# Patient Record
Sex: FEMALE | Race: WHITE | Employment: OTHER | URBAN - METROPOLITAN AREA
[De-identification: names, ages, dates, MRNs, and addresses within clinical notes are randomized per-mention and may not be internally consistent; named-entity substitution may affect disease eponyms.]

---

## 2020-02-05 ENCOUNTER — APPOINTMENT (OUTPATIENT)
Dept: ULTRASOUND IMAGING | Age: 70
DRG: 694 | End: 2020-02-05
Payer: MEDICAID

## 2020-02-05 ENCOUNTER — APPOINTMENT (OUTPATIENT)
Dept: GENERAL RADIOLOGY | Age: 70
DRG: 694 | End: 2020-02-05
Payer: MEDICAID

## 2020-02-05 ENCOUNTER — HOSPITAL ENCOUNTER (INPATIENT)
Age: 70
LOS: 7 days | Discharge: HOME | DRG: 694 | End: 2020-02-12
Attending: EMERGENCY MEDICINE | Admitting: INTERNAL MEDICINE
Payer: MEDICAID

## 2020-02-05 ENCOUNTER — APPOINTMENT (OUTPATIENT)
Dept: CT IMAGING | Age: 70
DRG: 694 | End: 2020-02-05
Payer: MEDICAID

## 2020-02-05 PROBLEM — R19.00 PELVIC MASS: Status: ACTIVE | Noted: 2020-02-05

## 2020-02-05 PROBLEM — R33.8 ACUTE URINARY RETENTION: Status: ACTIVE | Noted: 2020-02-05

## 2020-02-05 PROBLEM — N13.30 HYDRONEPHROSIS: Status: ACTIVE | Noted: 2020-02-05

## 2020-02-05 LAB
ALBUMIN SERPL-MCNC: 3 G/DL (ref 3.5–5.2)
ALP BLD-CCNC: 110 U/L (ref 35–104)
ALT SERPL-CCNC: 6 U/L (ref 0–32)
ANION GAP SERPL CALCULATED.3IONS-SCNC: 10 MMOL/L (ref 7–16)
AST SERPL-CCNC: 12 U/L (ref 0–31)
B.E.: -3.8 MMOL/L (ref -3–3)
BACTERIA: ABNORMAL /HPF
BASOPHILS ABSOLUTE: 0.02 E9/L (ref 0–0.2)
BASOPHILS RELATIVE PERCENT: 0.2 % (ref 0–2)
BETA-HYDROXYBUTYRATE: 0.76 MMOL/L (ref 0.02–0.27)
BILIRUB SERPL-MCNC: 0.2 MG/DL (ref 0–1.2)
BILIRUBIN URINE: NEGATIVE
BLOOD, URINE: ABNORMAL
BUN BLDV-MCNC: 12 MG/DL (ref 8–23)
CALCIUM SERPL-MCNC: 9.1 MG/DL (ref 8.6–10.2)
CHLORIDE BLD-SCNC: 91 MMOL/L (ref 98–107)
CHP ED QC CHECK: NORMAL
CLARITY: ABNORMAL
CO2: 21 MMOL/L (ref 22–29)
COHB: 1.7 % (ref 0–1.5)
COLOR: YELLOW
CREAT SERPL-MCNC: 0.8 MG/DL (ref 0.5–1)
CRITICAL: ABNORMAL
DATE ANALYZED: ABNORMAL
DATE OF COLLECTION: ABNORMAL
EOSINOPHILS ABSOLUTE: 0.1 E9/L (ref 0.05–0.5)
EOSINOPHILS RELATIVE PERCENT: 0.9 % (ref 0–6)
EPITHELIAL CELLS, UA: ABNORMAL /HPF
GFR AFRICAN AMERICAN: >60
GFR NON-AFRICAN AMERICAN: >60 ML/MIN/1.73
GLUCOSE BLD-MCNC: 461 MG/DL
GLUCOSE BLD-MCNC: 472 MG/DL (ref 74–99)
GLUCOSE URINE: >=1000 MG/DL
HCO3: 20.6 MMOL/L (ref 22–26)
HCT VFR BLD CALC: 27.5 % (ref 34–48)
HEMOGLOBIN: 8.2 G/DL (ref 11.5–15.5)
HHB: 6.2 % (ref 0–5)
IMMATURE GRANULOCYTES #: 0.14 E9/L
IMMATURE GRANULOCYTES %: 1.3 % (ref 0–5)
KETONES, URINE: NEGATIVE MG/DL
LAB: ABNORMAL
LACTIC ACID: 1.1 MMOL/L (ref 0.5–2.2)
LEUKOCYTE ESTERASE, URINE: ABNORMAL
LYMPHOCYTES ABSOLUTE: 0.9 E9/L (ref 1.5–4)
LYMPHOCYTES RELATIVE PERCENT: 8.3 % (ref 20–42)
Lab: ABNORMAL
MCH RBC QN AUTO: 24.5 PG (ref 26–35)
MCHC RBC AUTO-ENTMCNC: 29.8 % (ref 32–34.5)
MCV RBC AUTO: 82.1 FL (ref 80–99.9)
METER GLUCOSE: 325 MG/DL (ref 74–99)
METER GLUCOSE: 461 MG/DL (ref 74–99)
METHB: 0.3 % (ref 0–1.5)
MODE: ABNORMAL
MONOCYTES ABSOLUTE: 0.97 E9/L (ref 0.1–0.95)
MONOCYTES RELATIVE PERCENT: 8.9 % (ref 2–12)
NEUTROPHILS ABSOLUTE: 8.76 E9/L (ref 1.8–7.3)
NEUTROPHILS RELATIVE PERCENT: 80.4 % (ref 43–80)
NITRITE, URINE: POSITIVE
O2 CONTENT: 11.3 ML/DL
O2 SATURATION: 93.7 % (ref 92–98.5)
O2HB: 91.8 % (ref 94–97)
OPERATOR ID: 7874
PATIENT TEMP: 37 C
PCO2: 34.8 MMHG (ref 35–45)
PDW BLD-RTO: 16.3 FL (ref 11.5–15)
PH BLOOD GAS: 7.39 (ref 7.35–7.45)
PH UA: 6 (ref 5–9)
PLATELET # BLD: 308 E9/L (ref 130–450)
PMV BLD AUTO: 10.7 FL (ref 7–12)
PO2: 70.2 MMHG (ref 75–100)
POTASSIUM SERPL-SCNC: 4.1 MMOL/L (ref 3.5–5)
PROTEIN UA: NEGATIVE MG/DL
RBC # BLD: 3.35 E12/L (ref 3.5–5.5)
RBC UA: ABNORMAL /HPF (ref 0–2)
SODIUM BLD-SCNC: 122 MMOL/L (ref 132–146)
SOURCE, BLOOD GAS: ABNORMAL
SPECIFIC GRAVITY UA: <=1.005 (ref 1–1.03)
THB: 8.7 G/DL (ref 11.5–16.5)
TIME ANALYZED: 1550
TOTAL PROTEIN: 6.9 G/DL (ref 6.4–8.3)
TROPONIN: <0.01 NG/ML (ref 0–0.03)
TSH SERPL DL<=0.05 MIU/L-ACNC: 0.08 UIU/ML (ref 0.27–4.2)
UROBILINOGEN, URINE: 0.2 E.U./DL
WBC # BLD: 10.9 E9/L (ref 4.5–11.5)
WBC UA: >20 /HPF (ref 0–5)

## 2020-02-05 PROCEDURE — 87077 CULTURE AEROBIC IDENTIFY: CPT

## 2020-02-05 PROCEDURE — 99223 1ST HOSP IP/OBS HIGH 75: CPT | Performed by: INTERNAL MEDICINE

## 2020-02-05 PROCEDURE — 2580000003 HC RX 258: Performed by: INTERNAL MEDICINE

## 2020-02-05 PROCEDURE — 74177 CT ABD & PELVIS W/CONTRAST: CPT

## 2020-02-05 PROCEDURE — 2060000000 HC ICU INTERMEDIATE R&B

## 2020-02-05 PROCEDURE — 82962 GLUCOSE BLOOD TEST: CPT

## 2020-02-05 PROCEDURE — 6370000000 HC RX 637 (ALT 250 FOR IP): Performed by: EMERGENCY MEDICINE

## 2020-02-05 PROCEDURE — 72170 X-RAY EXAM OF PELVIS: CPT

## 2020-02-05 PROCEDURE — 6360000002 HC RX W HCPCS: Performed by: NURSE PRACTITIONER

## 2020-02-05 PROCEDURE — APPSS30 APP SPLIT SHARED TIME 16-30 MINUTES: Performed by: NURSE PRACTITIONER

## 2020-02-05 PROCEDURE — 6370000000 HC RX 637 (ALT 250 FOR IP): Performed by: NURSE PRACTITIONER

## 2020-02-05 PROCEDURE — 71045 X-RAY EXAM CHEST 1 VIEW: CPT

## 2020-02-05 PROCEDURE — 2580000003 HC RX 258: Performed by: PHYSICIAN ASSISTANT

## 2020-02-05 PROCEDURE — 93005 ELECTROCARDIOGRAM TRACING: CPT | Performed by: PHYSICIAN ASSISTANT

## 2020-02-05 PROCEDURE — 83605 ASSAY OF LACTIC ACID: CPT

## 2020-02-05 PROCEDURE — 6360000002 HC RX W HCPCS: Performed by: EMERGENCY MEDICINE

## 2020-02-05 PROCEDURE — 6360000004 HC RX CONTRAST MEDICATION: Performed by: RADIOLOGY

## 2020-02-05 PROCEDURE — 80053 COMPREHEN METABOLIC PANEL: CPT

## 2020-02-05 PROCEDURE — 87040 BLOOD CULTURE FOR BACTERIA: CPT

## 2020-02-05 PROCEDURE — 82010 KETONE BODYS QUAN: CPT

## 2020-02-05 PROCEDURE — 2580000003 HC RX 258: Performed by: RADIOLOGY

## 2020-02-05 PROCEDURE — 2580000003 HC RX 258: Performed by: NURSE PRACTITIONER

## 2020-02-05 PROCEDURE — 93970 EXTREMITY STUDY: CPT

## 2020-02-05 PROCEDURE — 82805 BLOOD GASES W/O2 SATURATION: CPT

## 2020-02-05 PROCEDURE — 94760 N-INVAS EAR/PLS OXIMETRY 1: CPT

## 2020-02-05 PROCEDURE — 84484 ASSAY OF TROPONIN QUANT: CPT

## 2020-02-05 PROCEDURE — 81001 URINALYSIS AUTO W/SCOPE: CPT

## 2020-02-05 PROCEDURE — 85025 COMPLETE CBC W/AUTO DIFF WBC: CPT

## 2020-02-05 PROCEDURE — 87088 URINE BACTERIA CULTURE: CPT

## 2020-02-05 PROCEDURE — 84443 ASSAY THYROID STIM HORMONE: CPT

## 2020-02-05 PROCEDURE — 87186 SC STD MICRODIL/AGAR DIL: CPT

## 2020-02-05 PROCEDURE — 96374 THER/PROPH/DIAG INJ IV PUSH: CPT

## 2020-02-05 PROCEDURE — 99285 EMERGENCY DEPT VISIT HI MDM: CPT

## 2020-02-05 PROCEDURE — 2580000003 HC RX 258: Performed by: EMERGENCY MEDICINE

## 2020-02-05 RX ORDER — LISINOPRIL 2.5 MG/1
2.5 TABLET ORAL DAILY
COMMUNITY

## 2020-02-05 RX ORDER — METFORMIN HYDROCHLORIDE 500 MG/1
500 TABLET, EXTENDED RELEASE ORAL 2 TIMES DAILY
Status: DISCONTINUED | OUTPATIENT
Start: 2020-02-05 | End: 2020-02-05

## 2020-02-05 RX ORDER — METFORMIN HYDROCHLORIDE 500 MG/1
500 TABLET, EXTENDED RELEASE ORAL 2 TIMES DAILY
COMMUNITY

## 2020-02-05 RX ORDER — ONDANSETRON 2 MG/ML
4 INJECTION INTRAMUSCULAR; INTRAVENOUS EVERY 8 HOURS PRN
Status: DISCONTINUED | OUTPATIENT
Start: 2020-02-05 | End: 2020-02-05

## 2020-02-05 RX ORDER — DEXTROSE MONOHYDRATE 25 G/50ML
12.5 INJECTION, SOLUTION INTRAVENOUS PRN
Status: DISCONTINUED | OUTPATIENT
Start: 2020-02-05 | End: 2020-02-12 | Stop reason: HOSPADM

## 2020-02-05 RX ORDER — SODIUM CHLORIDE 9 MG/ML
INJECTION, SOLUTION INTRAVENOUS CONTINUOUS
Status: DISCONTINUED | OUTPATIENT
Start: 2020-02-05 | End: 2020-02-05

## 2020-02-05 RX ORDER — MORPHINE SULFATE 2 MG/ML
2 INJECTION, SOLUTION INTRAMUSCULAR; INTRAVENOUS ONCE
Status: COMPLETED | OUTPATIENT
Start: 2020-02-05 | End: 2020-02-05

## 2020-02-05 RX ORDER — LISINOPRIL 2.5 MG/1
2.5 TABLET ORAL DAILY
Status: DISCONTINUED | OUTPATIENT
Start: 2020-02-05 | End: 2020-02-12 | Stop reason: HOSPADM

## 2020-02-05 RX ORDER — INSULIN GLARGINE 100 [IU]/ML
10 INJECTION, SOLUTION SUBCUTANEOUS NIGHTLY
Status: DISCONTINUED | OUTPATIENT
Start: 2020-02-05 | End: 2020-02-12 | Stop reason: HOSPADM

## 2020-02-05 RX ORDER — ACYCLOVIR 400 MG/1
400 TABLET ORAL 2 TIMES DAILY
COMMUNITY

## 2020-02-05 RX ORDER — SODIUM CHLORIDE 0.9 % (FLUSH) 0.9 %
10 SYRINGE (ML) INJECTION EVERY 12 HOURS SCHEDULED
Status: DISCONTINUED | OUTPATIENT
Start: 2020-02-05 | End: 2020-02-05

## 2020-02-05 RX ORDER — DEXTROSE MONOHYDRATE 50 MG/ML
100 INJECTION, SOLUTION INTRAVENOUS PRN
Status: DISCONTINUED | OUTPATIENT
Start: 2020-02-05 | End: 2020-02-12 | Stop reason: HOSPADM

## 2020-02-05 RX ORDER — ONDANSETRON 2 MG/ML
4 INJECTION INTRAMUSCULAR; INTRAVENOUS EVERY 6 HOURS PRN
Status: DISCONTINUED | OUTPATIENT
Start: 2020-02-05 | End: 2020-02-12

## 2020-02-05 RX ORDER — NICOTINE POLACRILEX 4 MG
15 LOZENGE BUCCAL PRN
Status: DISCONTINUED | OUTPATIENT
Start: 2020-02-05 | End: 2020-02-12 | Stop reason: HOSPADM

## 2020-02-05 RX ORDER — ALOGLIPTIN 25 MG/1
25 TABLET, FILM COATED ORAL DAILY
COMMUNITY

## 2020-02-05 RX ORDER — NATEGLINIDE 60 MG/1
150 TABLET ORAL 3 TIMES DAILY
Status: DISCONTINUED | OUTPATIENT
Start: 2020-02-05 | End: 2020-02-05

## 2020-02-05 RX ORDER — SODIUM CHLORIDE 0.9 % (FLUSH) 0.9 %
10 SYRINGE (ML) INJECTION EVERY 12 HOURS SCHEDULED
Status: DISCONTINUED | OUTPATIENT
Start: 2020-02-05 | End: 2020-02-12 | Stop reason: HOSPADM

## 2020-02-05 RX ORDER — ACYCLOVIR 200 MG/1
400 CAPSULE ORAL 2 TIMES DAILY
Status: DISCONTINUED | OUTPATIENT
Start: 2020-02-05 | End: 2020-02-12 | Stop reason: HOSPADM

## 2020-02-05 RX ORDER — 0.9 % SODIUM CHLORIDE 0.9 %
1000 INTRAVENOUS SOLUTION INTRAVENOUS ONCE
Status: COMPLETED | OUTPATIENT
Start: 2020-02-05 | End: 2020-02-05

## 2020-02-05 RX ORDER — LITHIUM CARBONATE 300 MG/1
300 CAPSULE ORAL 2 TIMES DAILY WITH MEALS
Status: DISCONTINUED | OUTPATIENT
Start: 2020-02-05 | End: 2020-02-12 | Stop reason: HOSPADM

## 2020-02-05 RX ORDER — FENOFIBRATE 160 MG/1
160 TABLET ORAL DAILY
COMMUNITY

## 2020-02-05 RX ORDER — ACETAMINOPHEN 325 MG/1
650 TABLET ORAL EVERY 4 HOURS PRN
Status: DISCONTINUED | OUTPATIENT
Start: 2020-02-05 | End: 2020-02-05 | Stop reason: SDUPTHER

## 2020-02-05 RX ORDER — FENOFIBRATE 160 MG/1
160 TABLET ORAL DAILY
Status: DISCONTINUED | OUTPATIENT
Start: 2020-02-05 | End: 2020-02-12 | Stop reason: HOSPADM

## 2020-02-05 RX ORDER — LEVOTHYROXINE SODIUM 0.05 MG/1
50 TABLET ORAL DAILY
COMMUNITY

## 2020-02-05 RX ORDER — ACETAMINOPHEN 325 MG/1
650 TABLET ORAL EVERY 4 HOURS PRN
Status: DISCONTINUED | OUTPATIENT
Start: 2020-02-05 | End: 2020-02-05

## 2020-02-05 RX ORDER — SODIUM CHLORIDE 0.9 % (FLUSH) 0.9 %
10 SYRINGE (ML) INJECTION PRN
Status: COMPLETED | OUTPATIENT
Start: 2020-02-05 | End: 2020-02-05

## 2020-02-05 RX ORDER — SODIUM CHLORIDE 0.9 % (FLUSH) 0.9 %
10 SYRINGE (ML) INJECTION PRN
Status: DISCONTINUED | OUTPATIENT
Start: 2020-02-05 | End: 2020-02-05

## 2020-02-05 RX ORDER — SODIUM CHLORIDE 0.9 % (FLUSH) 0.9 %
10 SYRINGE (ML) INJECTION PRN
Status: DISCONTINUED | OUTPATIENT
Start: 2020-02-05 | End: 2020-02-12 | Stop reason: HOSPADM

## 2020-02-05 RX ORDER — LEVOTHYROXINE SODIUM 0.05 MG/1
50 TABLET ORAL DAILY
Status: DISCONTINUED | OUTPATIENT
Start: 2020-02-06 | End: 2020-02-12 | Stop reason: HOSPADM

## 2020-02-05 RX ORDER — MORPHINE SULFATE 4 MG/ML
4 INJECTION, SOLUTION INTRAMUSCULAR; INTRAVENOUS EVERY 4 HOURS PRN
Status: DISCONTINUED | OUTPATIENT
Start: 2020-02-05 | End: 2020-02-12

## 2020-02-05 RX ORDER — SODIUM CHLORIDE 9 MG/ML
INJECTION, SOLUTION INTRAVENOUS CONTINUOUS
Status: DISCONTINUED | OUTPATIENT
Start: 2020-02-05 | End: 2020-02-08

## 2020-02-05 RX ORDER — LITHIUM CARBONATE 300 MG/1
300 CAPSULE ORAL 2 TIMES DAILY WITH MEALS
COMMUNITY

## 2020-02-05 RX ADMIN — LISINOPRIL 2.5 MG: 2.5 TABLET ORAL at 22:19

## 2020-02-05 RX ADMIN — Medication 10 ML: at 16:21

## 2020-02-05 RX ADMIN — FENOFIBRATE 160 MG: 160 TABLET ORAL at 22:21

## 2020-02-05 RX ADMIN — INSULIN GLARGINE 10 UNITS: 100 INJECTION, SOLUTION SUBCUTANEOUS at 22:25

## 2020-02-05 RX ADMIN — ENOXAPARIN SODIUM 60 MG: 60 INJECTION SUBCUTANEOUS at 23:17

## 2020-02-05 RX ADMIN — Medication 10 ML: at 22:18

## 2020-02-05 RX ADMIN — INSULIN HUMAN 5 UNITS: 100 INJECTION, SOLUTION PARENTERAL at 18:20

## 2020-02-05 RX ADMIN — SODIUM CHLORIDE 1000 ML: 9 INJECTION, SOLUTION INTRAVENOUS at 17:20

## 2020-02-05 RX ADMIN — MORPHINE SULFATE 2 MG: 2 INJECTION, SOLUTION INTRAMUSCULAR; INTRAVENOUS at 18:55

## 2020-02-05 RX ADMIN — INSULIN LISPRO 4 UNITS: 100 INJECTION, SOLUTION INTRAVENOUS; SUBCUTANEOUS at 22:37

## 2020-02-05 RX ADMIN — MORPHINE SULFATE 2 MG: 2 INJECTION, SOLUTION INTRAMUSCULAR; INTRAVENOUS at 17:20

## 2020-02-05 RX ADMIN — ACYCLOVIR 400 MG: 200 CAPSULE ORAL at 22:24

## 2020-02-05 RX ADMIN — SODIUM CHLORIDE: 9 INJECTION, SOLUTION INTRAVENOUS at 22:18

## 2020-02-05 RX ADMIN — SODIUM CHLORIDE 1000 ML: 9 INJECTION, SOLUTION INTRAVENOUS at 15:49

## 2020-02-05 RX ADMIN — IOPAMIDOL 110 ML: 755 INJECTION, SOLUTION INTRAVENOUS at 16:23

## 2020-02-05 SDOH — HEALTH STABILITY: MENTAL HEALTH: HOW OFTEN DO YOU HAVE A DRINK CONTAINING ALCOHOL?: NEVER

## 2020-02-05 ASSESSMENT — PAIN SCALES - GENERAL
PAINLEVEL_OUTOF10: 9
PAINLEVEL_OUTOF10: 10

## 2020-02-05 NOTE — H&P
Provider, MD   alogliptin (NESINA) 25 MG TABS tablet Take 25 mg by mouth daily   Yes Historical Provider, MD   lithium 300 MG capsule Take 300 mg by mouth 2 times daily (with meals)   Yes Historical Provider, MD   fenofibrate 160 MG tablet Take 160 mg by mouth daily   Yes Historical Provider, MD   lisinopril (PRINIVIL;ZESTRIL) 2.5 MG tablet Take 2.5 mg by mouth daily   Yes Historical Provider, MD   Nateglinide (STARLIX PO) Take 150 mg by mouth 3 times daily   Yes Historical Provider, MD       Allergies:    Codeine and Sulfa antibiotics    Social History:    reports that she has quit smoking. She has never used smokeless tobacco. She reports that she does not drink alcohol or use drugs. Family History:   family history is not on file.        PHYSICAL EXAM:  Vitals:  BP (!) 178/85   Pulse 95   Temp 97.5 °F (36.4 °C) (Oral)   Resp 16   Ht 5' 2\" (1.575 m)   SpO2 95%     General Appearance: alert and oriented to person, place and time and in no acute distress  Skin: warm and dry, no rashes or lesions noted, does have several superficial sacral decubiti  Head: normocephalic and atraumatic  Eyes: pupils equal, round, and reactive to light, extraocular eye movements intact, conjunctivae normal  Neck: neck supple and non tender without mass   Pulmonary/Chest: clear to auscultation bilaterally- no wheezes, rales or rhonchi, normal air movement, no respiratory distress on room air  Cardiovascular: normal rate and rythym , normal S1 and S2 and no murmur noted, has port right upper chest  Abdomen: firm, tender in lower abdomen, distended, normal bowel sounds, R nephrostomy tube intact draining clear yellow urine  Extremities: no cyanosis, no clubbing and +RLE edema  Neurologic: follows commands, pleasant with exam and speech normal        LABS:  Recent Labs     02/05/20  1433 02/05/20  1504   *  --    K 4.1  --    CL 91*  --    CO2 21*  --    BUN 12  --    CREATININE 0.8  --    GLUCOSE 472* 461   CALCIUM 9.1  -- Extraocular muscles intact. Pupils equal, round, reactive to light. I agree with the assessment and plan of Adelita Bush, FANI - CNP    Pelvic mass  Hydronephrosis with hydroureter  Hyponatremia likely from hyperglycemia and hypovolemia  Dehydration   Dm type 2 uncontrolled  Acidosis   Bipolar d/o  RLE dvt        Electronically signed by Lenny Friend D.O.   Hospitalist  [facility] at [facility]

## 2020-02-05 NOTE — ED PROVIDER NOTES
ED Attending  CC: No        HPI:  2/5/20,   Time: 2:07 PM         Bart Link is a 79 y.o. female presenting to the ED for sacral wound, lower abdominal pain, weakness and tachycardia, beginning few days ago. The complaint has been persistent, moderate in severity, and worsened by nothing. Patient presents to the emergency room via private car with her son and daughter-in-law. She has a complex history uterine cancer with mets to her left lung. The patient has been treated and cared for in the [facility] by her other son. The family states that she has had several rounds of chemotherapy and radiation. They come in today concerned about her increasing weakness. Patient's last hospitalization was in the [facility] area around [place]. She was diagnosed with a right lower extremity DVT at that time and placed on Eliquis. The family states that over the past month the patient has been increasingly weak. She has sacral ulcers that are painful and draining. The patient has had some vaginal discharge of a dark and bloody nature as well. Her daughter-in-law feels that the patient's lower abdomen is bloated. The patient's biggest complaint is the pain in her sacral area. She does have a left-sided nephrostomy tube. The daughter in law feels that her urine has been more dark and cloudy. The patient is diabetic and does not check her sugars at home. She states that she is not on insulin but that her sugars are generally controlled well with metformin. The patient denies any shortness of breath. She has a mild chronic cough. No documented fevers. They are not able to tell me if the patient has had a lung scan to rule out pulmonary emboli.         ROS:     Constitutional: Negative for fever and chills  HENT: Negative for ear pain, sore throat and sinus pressure  Eyes: Negative for pain, discharge and redness  Respiratory:  See HPI  Cardiovascular: Negative for CP, edema or palpitations  Gastrointestinal: See HPI  Genitourinary: Negative for dysuria and frequency  Musculoskeletal: See HPi  Skin: See HPI  Neurological: Negative for weakness and headaches  Hematological: Negative for adenopathy    All other systems reviewed and are negative      -------------------------------- PAST HISTORY ----------------------------------  Past Medical History:  has a past medical history of Cancer ([facility] [address] [facility] and Thyroid disease. Past Surgical History:  has a past surgical history that includes Hysterectomy. Social History:  reports that she has quit smoking. She has never used smokeless tobacco. She reports that she does not drink alcohol or use drugs. Family History: family history is not on file. The patients home medications have been reviewed.     Allergies: Codeine and Sulfa antibiotics    --------------------------------- RESULTS ------------------------------------------  All laboratory and radiology results have been personally reviewed by myself   LABS:  Results for orders placed or performed during the hospital encounter of 02/05/20   CBC Auto Differential   Result Value Ref Range    WBC 10.9 4.5 - 11.5 E9/L    RBC 3.35 (L) 3.50 - 5.50 E12/L    Hemoglobin 8.2 (L) 11.5 - 15.5 g/dL    Hematocrit 27.5 (L) 34.0 - 48.0 %    MCV 82.1 80.0 - 99.9 fL    MCH 24.5 (L) 26.0 - 35.0 pg    MCHC 29.8 (L) 32.0 - 34.5 %    RDW 16.3 (H) 11.5 - 15.0 fL    Platelets 751 949 - 181 E9/L    MPV 10.7 7.0 - 12.0 fL    Neutrophils % 80.4 (H) 43.0 - 80.0 %    Immature Granulocytes % 1.3 0.0 - 5.0 %    Lymphocytes % 8.3 (L) 20.0 - 42.0 %    Monocytes % 8.9 2.0 - 12.0 %    Eosinophils % 0.9 0.0 - 6.0 %    Basophils % 0.2 0.0 - 2.0 %    Neutrophils Absolute 8.76 (H) 1.80 - 7.30 E9/L    Immature Granulocytes # 0.14 E9/L    Lymphocytes Absolute 0.90 (L) 1.50 - 4.00 E9/L    Monocytes Absolute 0.97 (H) 0.10 - 0.95 E9/L    Eosinophils Absolute 0.10 0.05 - 0.50 E9/L    Basophils Absolute 0.02 0.00 - 0.20 E9/L ms    P Axis 31 degrees    T Axis 47 degrees       RADIOLOGY:  Interpreted by Radiologist.  Shane Ser DUP LOWER EXTREMITIES BILATERAL VENOUS   Final Result   Positive for DVT including occlusive intraluminal involvement of the   right common femoral vein through the mid thigh, and nonocclusive   thrombus is noted more distally on the right. No evidence of left-sided DVT. ALERT:  THIS IS AN ABNORMAL REPORT-positive for right leg DVT   extending to the common femoral vein. Note: The patient's nurse was contacted telephonically by myself at   the time of this dictation to communicate positive findings after   hours. XR PELVIS (1-2 VIEWS)   Final Result      No evidence of acute fracture. CT ABDOMEN PELVIS W IV CONTRAST Additional Contrast? None   Final Result   Addendum 1 of 1   Addendum: Complex findings discussed with Dr. Corin Best in the ER at   1720 hours 2/5/2020. Final      XR CHEST PORTABLE   Final Result      1. Atelectasis and/or infiltrate at the right lower lobe. 2. Questionable lung nodule abutting the left hilum.                ----------------- NURSING NOTES AND VITALS REVIEWED ---------------   The nursing notes within the ED encounter and vital signs as below have been reviewed. BP (!) 130/59   Pulse 97   Temp 97.3 °F (36.3 °C) (Oral)   Resp 16   Ht 5' 2\" (1.575 m)   Wt 134 lb 14.4 oz (61.2 kg)   SpO2 96%   BMI 24.67 kg/m²   Oxygen Saturation Interpretation: Normal      --------------------------------PHYSICAL EXAM------------------------------------      Constitutional/General: Alert and oriented x3, NAD  Head: NC/AT  Eyes: PERRL, EOMI  Mouth: Dry oral mucosa  Neck: Supple, full ROM, no meningeal signs  Pulmonary: Lungs decreased but clear to auscultation bilaterally, no wheezes, rales, or rhonchi. Not in respiratory distress  Cardiovascular:  Tachycardic but regula. No murmurs, gallops, or rubs. 2+ distal pulses  Abdomen: Soft, + BS. No distension.   Distended lower abdomen with tenderness diffusely. Nephrostomy tube noted on left. Intact. No palpable rigidity, rebound or guarding  Extremities: Moves all extremities x 4. Right LE edema compared to left. Warm and well perfused  Skin: Multiple sacral ulcers seen, stage 1/2. No visible discharge or palpable abscess. Right groin with candidal dermatitis. Neurologic: GCS 15,  Intact.   No focal deficits  Psych: Normal Affect      ------------------------ ED COURSE/MEDICAL DECISION MAKING----------------------  Medications   sodium chloride flush 0.9 % injection 10 mL (10 mLs Intravenous Given 2/5/20 2218)   morphine sulfate (PF) injection 4 mg (has no administration in time range)   0.9 % sodium chloride infusion ( Intravenous New Bag 2/5/20 2218)   insulin glargine (LANTUS) injection vial 10 Units (10 Units Subcutaneous Given 2/5/20 2225)   insulin lispro (HUMALOG) injection vial 0-12 Units (8 Units Subcutaneous Given 2/5/20 2225)   insulin lispro (HUMALOG) injection vial 0-6 Units (has no administration in time range)   glucose (GLUTOSE) 40 % oral gel 15 g (has no administration in time range)   dextrose 50 % IV solution (has no administration in time range)   glucagon (rDNA) injection 1 mg (has no administration in time range)   dextrose 5 % solution (has no administration in time range)   sodium chloride flush 0.9 % injection 10 mL (has no administration in time range)   magnesium hydroxide (MILK OF MAGNESIA) 400 MG/5ML suspension 30 mL (has no administration in time range)   ondansetron (ZOFRAN) injection 4 mg (has no administration in time range)   fenofibrate tablet 160 mg (160 mg Oral Given 2/5/20 2221)   levothyroxine (SYNTHROID) tablet 50 mcg (has no administration in time range)   lisinopril (PRINIVIL;ZESTRIL) tablet 2.5 mg (2.5 mg Oral Given 2/5/20 2219)   lithium capsule 300 mg (300 mg Oral Given 2/5/20 2224)   acyclovir (ZOVIRAX) capsule 400 mg (400 mg Oral Given 2/5/20 2224)   enoxaparin (LOVENOX) injection 60 mg (has no administration in time range)   0.9 % sodium chloride bolus (0 mLs Intravenous Stopped 2/5/20 1903)   0.9 % sodium chloride bolus (0 mLs Intravenous Stopped 2/5/20 1721)   iopamidol (ISOVUE-370) 76 % injection 110 mL (110 mLs Intravenous Given 2/5/20 1623)   sodium chloride flush 0.9 % injection 10 mL (10 mLs Intravenous Given 2/5/20 1621)   morphine (PF) injection 2 mg (2 mg Intravenous Given 2/5/20 1720)   insulin regular (HUMULIN R;NOVOLIN R) injection 5 Units (5 Units Intravenous Given 2/5/20 1820)   morphine (PF) injection 2 mg (2 mg Intravenous Given 2/5/20 1855)         Medical Decision Making:    Complicated history with numerous complaints. Concern for diabetic ketoacidosis. She does look very dry. CT scan of the abdomen and pelvis secondary to pain and history of uterine cancer. Patient is on Eliquis already for right lower extremity DVT. Findings discussed with Dr. Hai Javed and care transitioned to his care the completion of my shift. She is likely going to need admitted       Counseling: The emergency provider has spoken with the patient and family member patient, son and daughter and discussed todays results, in addition to providing specific details for the plan of care and counseling regarding the diagnosis and prognosis. Questions are answered at this time and they are agreeable with the plan.      ------------------------ IMPRESSION AND DISPOSITION -------------------------------    IMPRESSION  1. Pelvic mass    2. Skin ulcer of sacrum, unspecified ulcer stage ([address]    3. Urinary retention    4. Acute deep vein thrombosis (DVT) of distal vein of right lower extremity (HCC)    5. General weakness    6.  Hyperglycemia        DISPOSITION  Disposition: Admit  Patient condition is stable                   Dk Denny PA-C  02/05/20 2861

## 2020-02-06 ENCOUNTER — APPOINTMENT (OUTPATIENT)
Dept: CT IMAGING | Age: 70
DRG: 694 | End: 2020-02-06
Payer: MEDICAID

## 2020-02-06 PROBLEM — E44.0 MODERATE PROTEIN-CALORIE MALNUTRITION (HCC): Chronic | Status: ACTIVE | Noted: 2020-02-06

## 2020-02-06 LAB
ALBUMIN SERPL-MCNC: 2.6 G/DL (ref 3.5–5.2)
ANION GAP SERPL CALCULATED.3IONS-SCNC: 8 MMOL/L (ref 7–16)
BUN BLDV-MCNC: 6 MG/DL (ref 8–23)
CA 125: 52 U/ML (ref 0–35)
CALCIUM SERPL-MCNC: 8.9 MG/DL (ref 8.6–10.2)
CHLORIDE BLD-SCNC: 105 MMOL/L (ref 98–107)
CO2: 22 MMOL/L (ref 22–29)
CREAT SERPL-MCNC: 0.7 MG/DL (ref 0.5–1)
EKG ATRIAL RATE: 97 BPM
EKG P AXIS: 31 DEGREES
EKG P-R INTERVAL: 118 MS
EKG Q-T INTERVAL: 332 MS
EKG QRS DURATION: 68 MS
EKG QTC CALCULATION (BAZETT): 421 MS
EKG T AXIS: 47 DEGREES
EKG VENTRICULAR RATE: 97 BPM
GFR AFRICAN AMERICAN: >60
GFR NON-AFRICAN AMERICAN: >60 ML/MIN/1.73
GLUCOSE BLD-MCNC: 161 MG/DL (ref 74–99)
HBA1C MFR BLD: 11.5 % (ref 4–5.6)
HCT VFR BLD CALC: 27.7 % (ref 34–48)
HEMOGLOBIN: 8 G/DL (ref 11.5–15.5)
LACTIC ACID: 1.4 MMOL/L (ref 0.5–2.2)
MAGNESIUM: 2 MG/DL (ref 1.6–2.6)
MCH RBC QN AUTO: 24.3 PG (ref 26–35)
MCHC RBC AUTO-ENTMCNC: 28.9 % (ref 32–34.5)
MCV RBC AUTO: 84.2 FL (ref 80–99.9)
METER GLUCOSE: 169 MG/DL (ref 74–99)
METER GLUCOSE: 201 MG/DL (ref 74–99)
METER GLUCOSE: 237 MG/DL (ref 74–99)
METER GLUCOSE: 241 MG/DL (ref 74–99)
PDW BLD-RTO: 16.3 FL (ref 11.5–15)
PLATELET # BLD: 295 E9/L (ref 130–450)
PMV BLD AUTO: 10.7 FL (ref 7–12)
POTASSIUM REFLEX MAGNESIUM: 3.4 MMOL/L (ref 3.5–5)
RBC # BLD: 3.29 E12/L (ref 3.5–5.5)
SODIUM BLD-SCNC: 135 MMOL/L (ref 132–146)
T3 FREE: 1.7 PG/ML (ref 2–4.4)
T4 FREE: 1.06 NG/DL (ref 0.93–1.7)
TSH SERPL DL<=0.05 MIU/L-ACNC: 0.06 UIU/ML (ref 0.27–4.2)
WBC # BLD: 9.7 E9/L (ref 4.5–11.5)

## 2020-02-06 PROCEDURE — 99255 IP/OBS CONSLTJ NEW/EST HI 80: CPT | Performed by: INTERNAL MEDICINE

## 2020-02-06 PROCEDURE — 2580000003 HC RX 258: Performed by: NURSE PRACTITIONER

## 2020-02-06 PROCEDURE — APPSS30 APP SPLIT SHARED TIME 16-30 MINUTES: Performed by: NURSE PRACTITIONER

## 2020-02-06 PROCEDURE — 83605 ASSAY OF LACTIC ACID: CPT

## 2020-02-06 PROCEDURE — 99232 SBSQ HOSP IP/OBS MODERATE 35: CPT | Performed by: INTERNAL MEDICINE

## 2020-02-06 PROCEDURE — 83735 ASSAY OF MAGNESIUM: CPT

## 2020-02-06 PROCEDURE — 82962 GLUCOSE BLOOD TEST: CPT

## 2020-02-06 PROCEDURE — 93010 ELECTROCARDIOGRAM REPORT: CPT | Performed by: INTERNAL MEDICINE

## 2020-02-06 PROCEDURE — 36415 COLL VENOUS BLD VENIPUNCTURE: CPT

## 2020-02-06 PROCEDURE — 6370000000 HC RX 637 (ALT 250 FOR IP): Performed by: NURSE PRACTITIONER

## 2020-02-06 PROCEDURE — 86304 IMMUNOASSAY TUMOR CA 125: CPT

## 2020-02-06 PROCEDURE — 84439 ASSAY OF FREE THYROXINE: CPT

## 2020-02-06 PROCEDURE — 71250 CT THORAX DX C-: CPT

## 2020-02-06 PROCEDURE — 84481 FREE ASSAY (FT-3): CPT

## 2020-02-06 PROCEDURE — 80048 BASIC METABOLIC PNL TOTAL CA: CPT

## 2020-02-06 PROCEDURE — 82040 ASSAY OF SERUM ALBUMIN: CPT

## 2020-02-06 PROCEDURE — 6360000002 HC RX W HCPCS: Performed by: NURSE PRACTITIONER

## 2020-02-06 PROCEDURE — 84443 ASSAY THYROID STIM HORMONE: CPT

## 2020-02-06 PROCEDURE — 99222 1ST HOSP IP/OBS MODERATE 55: CPT | Performed by: NURSE PRACTITIONER

## 2020-02-06 PROCEDURE — 85027 COMPLETE CBC AUTOMATED: CPT

## 2020-02-06 PROCEDURE — 6360000002 HC RX W HCPCS: Performed by: INTERNAL MEDICINE

## 2020-02-06 PROCEDURE — 83036 HEMOGLOBIN GLYCOSYLATED A1C: CPT

## 2020-02-06 PROCEDURE — 2060000000 HC ICU INTERMEDIATE R&B

## 2020-02-06 RX ORDER — HEPARIN SODIUM (PORCINE) LOCK FLUSH IV SOLN 100 UNIT/ML 100 UNIT/ML
5 SOLUTION INTRAVENOUS 2 TIMES DAILY
Status: DISCONTINUED | OUTPATIENT
Start: 2020-02-06 | End: 2020-02-12 | Stop reason: HOSPADM

## 2020-02-06 RX ORDER — PETROLATUM 420 MG/G
OINTMENT TOPICAL 2 TIMES DAILY
Status: DISCONTINUED | OUTPATIENT
Start: 2020-02-06 | End: 2020-02-12 | Stop reason: HOSPADM

## 2020-02-06 RX ORDER — HEPARIN SODIUM (PORCINE) LOCK FLUSH IV SOLN 100 UNIT/ML 100 UNIT/ML
5 SOLUTION INTRAVENOUS PRN
Status: DISCONTINUED | OUTPATIENT
Start: 2020-02-06 | End: 2020-02-12 | Stop reason: HOSPADM

## 2020-02-06 RX ADMIN — ACYCLOVIR 400 MG: 200 CAPSULE ORAL at 20:09

## 2020-02-06 RX ADMIN — INSULIN GLARGINE 10 UNITS: 100 INJECTION, SOLUTION SUBCUTANEOUS at 20:11

## 2020-02-06 RX ADMIN — MORPHINE SULFATE 4 MG: 4 INJECTION, SOLUTION INTRAMUSCULAR; INTRAVENOUS at 22:14

## 2020-02-06 RX ADMIN — INSULIN LISPRO 2 UNITS: 100 INJECTION, SOLUTION INTRAVENOUS; SUBCUTANEOUS at 11:04

## 2020-02-06 RX ADMIN — LEVOTHYROXINE SODIUM 50 MCG: 50 TABLET ORAL at 06:17

## 2020-02-06 RX ADMIN — MORPHINE SULFATE 4 MG: 4 INJECTION, SOLUTION INTRAMUSCULAR; INTRAVENOUS at 12:22

## 2020-02-06 RX ADMIN — LISINOPRIL 2.5 MG: 2.5 TABLET ORAL at 08:59

## 2020-02-06 RX ADMIN — INSULIN LISPRO 4 UNITS: 100 INJECTION, SOLUTION INTRAVENOUS; SUBCUTANEOUS at 16:22

## 2020-02-06 RX ADMIN — INSULIN LISPRO 2 UNITS: 100 INJECTION, SOLUTION INTRAVENOUS; SUBCUTANEOUS at 20:11

## 2020-02-06 RX ADMIN — SODIUM CHLORIDE: 9 INJECTION, SOLUTION INTRAVENOUS at 10:27

## 2020-02-06 RX ADMIN — MORPHINE SULFATE 4 MG: 4 INJECTION, SOLUTION INTRAMUSCULAR; INTRAVENOUS at 17:49

## 2020-02-06 RX ADMIN — MORPHINE SULFATE 4 MG: 4 INJECTION, SOLUTION INTRAMUSCULAR; INTRAVENOUS at 01:33

## 2020-02-06 RX ADMIN — ENOXAPARIN SODIUM 60 MG: 60 INJECTION SUBCUTANEOUS at 22:13

## 2020-02-06 RX ADMIN — FENOFIBRATE 160 MG: 160 TABLET ORAL at 08:59

## 2020-02-06 RX ADMIN — ENOXAPARIN SODIUM 60 MG: 60 INJECTION SUBCUTANEOUS at 11:04

## 2020-02-06 RX ADMIN — PETROLATUM: 420 OINTMENT TOPICAL at 22:29

## 2020-02-06 RX ADMIN — ACYCLOVIR 400 MG: 200 CAPSULE ORAL at 08:59

## 2020-02-06 RX ADMIN — LITHIUM CARBONATE 300 MG: 300 CAPSULE, GELATIN COATED ORAL at 11:04

## 2020-02-06 RX ADMIN — INSULIN LISPRO 4 UNITS: 100 INJECTION, SOLUTION INTRAVENOUS; SUBCUTANEOUS at 06:16

## 2020-02-06 RX ADMIN — LITHIUM CARBONATE 300 MG: 300 CAPSULE, GELATIN COATED ORAL at 16:22

## 2020-02-06 RX ADMIN — SODIUM CHLORIDE: 9 INJECTION, SOLUTION INTRAVENOUS at 22:29

## 2020-02-06 ASSESSMENT — PAIN - FUNCTIONAL ASSESSMENT
PAIN_FUNCTIONAL_ASSESSMENT: PREVENTS OR INTERFERES SOME ACTIVE ACTIVITIES AND ADLS
PAIN_FUNCTIONAL_ASSESSMENT: ACTIVITIES ARE NOT PREVENTED
PAIN_FUNCTIONAL_ASSESSMENT: ACTIVITIES ARE NOT PREVENTED

## 2020-02-06 ASSESSMENT — PAIN DESCRIPTION - DESCRIPTORS
DESCRIPTORS: ACHING;CONSTANT;DISCOMFORT
DESCRIPTORS: ACHING;DISCOMFORT
DESCRIPTORS: ACHING;DISCOMFORT;CONSTANT

## 2020-02-06 ASSESSMENT — PAIN SCALES - GENERAL
PAINLEVEL_OUTOF10: 10
PAINLEVEL_OUTOF10: 0
PAINLEVEL_OUTOF10: 10
PAINLEVEL_OUTOF10: 0
PAINLEVEL_OUTOF10: 9
PAINLEVEL_OUTOF10: 0
PAINLEVEL_OUTOF10: 8

## 2020-02-06 ASSESSMENT — PAIN DESCRIPTION - PROGRESSION
CLINICAL_PROGRESSION: GRADUALLY WORSENING

## 2020-02-06 ASSESSMENT — PAIN DESCRIPTION - ORIENTATION
ORIENTATION: RIGHT
ORIENTATION: MID;RIGHT
ORIENTATION: RIGHT

## 2020-02-06 ASSESSMENT — PAIN DESCRIPTION - ONSET
ONSET: ON-GOING

## 2020-02-06 ASSESSMENT — PAIN DESCRIPTION - FREQUENCY
FREQUENCY: CONTINUOUS

## 2020-02-06 ASSESSMENT — PAIN DESCRIPTION - LOCATION
LOCATION: BUTTOCKS
LOCATION: BACK;BUTTOCKS

## 2020-02-06 ASSESSMENT — PAIN DESCRIPTION - PAIN TYPE
TYPE: ACUTE PAIN

## 2020-02-06 NOTE — PROGRESS NOTES
family but 50-75% at breakfast this AM)  · Anthropometric Measures:  · Ht: 5' 2\" (157.5 cm)   · Current Body Wt: 134 lb (60.8 kg)(2/5 bedwt)  · Admission Body Wt: 134 lb (60.8 kg)(2/5 first measured wt)  · Usual Body Wt: 165 lb (74.8 kg)(Stated but no EMR hx yet to confirm-Records from recent hospitalizations/treatments pending)  · % Weight Change:  ,  total subjective loss 19% - will confirm on follow-up with EMR  · Ideal Body Wt: 110 lb (49.9 kg), % Ideal Body 122%  · BMI Classification: BMI 18.5 - 24.9 Normal Weight    Nutrition Interventions:   Continue current diet, Start ONS(Diabetic ONS BID)  Continued Inpatient Monitoring, Education Initiated    Nutrition Evaluation:   · Evaluation: Goals set   · Goals: PO >75% at meals and ONS    · Monitoring: Meal Intake, Supplement Intake, Skin Integrity, Wound Healing, I&O, Weight, Pertinent Labs, Monitor Bowel Function      Electronically signed by Ayanna Roman RD, CNSC, LD on 2/6/20 at 2:41 PM    Contact Number: [phone]

## 2020-02-06 NOTE — CONSULTS
Palliative Care Department  Palliative Care Initial Consult  Provider: Harish Garcia Day: 2    Referring Provider:  Dr. Azalia Pereyra    Reason for Consult:  []  Code status Discussion  []  Assist with goals of care  []  Psychosocial support  [x]  Symptom Management  []  Advanced Care Planning    Chief Complaint: Rosa Danielson is a 79 y.o. female with chief complaint of sacral wound, lower abdominal pain, weakness and tachycardia. Active Hospital Problems    Diagnosis Date Noted    Acute urinary retention [R33.8] 02/05/2020    Pelvic mass [R19.00] 02/05/2020    Hydronephrosis [N13.30] 02/05/2020    Uncontrolled diabetes mellitus type 2 without complications ([facility] [address] [J14.97] 02/05/2020    Acidosis [E87.2]     Acute deep vein thrombosis (DVT) of right lower extremity (HCC) [I82.401]            Palliative Care Encounter/Recommendations:      - Goals of care: continue current management     - Code Status: full code     - Symptom Management:    Symptom Medications Number Doses in Past 24 hrs   Pain Morphine 4mg IV q4h PRN  2   Nausea/vomiting     Bowel Regime/constipation     Anxiety/agitation/depression     SOB     Appetite     Sleep                 Subjective:     HPI:  Rosa Danielson is a 79 y.o. female with significant past medical history of hypertension, DM, thyroid disease, hyperlipidemia, bipolar disorder, and stage IIIB endometrial serous adenocarcinoma in 2016, s/p BSO, pelvis/aortic lymphadenectomy, chemotherapy and radiation-treatment was at [facility] in [place], [place] who presented with sacral wound, lower abdominal pain, weakness, and tachycardia beginning a few days ago. Family reported that patient's last hospitalization was in the [facility]. At that time, patient was diagnosed with a right lower extremity DVT and placed on Eliquis. The family also stated that over the past month, patient has become increasingly weak.   She has sacral buttocks wound-states current pain regimen is helping  5. Support: Spoke with son, Silva Jeong, at bedside; support provided to patient and son      Donn Calderon HA  Palliative Medicine    Discussed patient and the plan of care with the other IDT members of Palliative Care, and with patient, family and floor nurse. Thank you for allowing Palliative Medicine to participate in the care of Shantanu Schmidt. Note: This report was completed using computerKLab voiced recognition software. Every effort has been made to ensure accuracy; however, inadvertent computerized transcription errors may be present.

## 2020-02-06 NOTE — PLAN OF CARE
Problem: Risk for Impaired Skin Integrity  Goal: Tissue integrity - skin and mucous membranes  Description  Structural intactness and normal physiological function of skin and  mucous membranes.   Outcome: Met This Shift     Problem: Falls - Risk of:  Goal: Will remain free from falls  Description  Will remain free from falls  Outcome: Met This Shift  Goal: Absence of physical injury  Description  Absence of physical injury  Outcome: Met This Shift     Problem: Pain:  Goal: Control of acute pain  Description  Control of acute pain  Outcome: Met This Shift     Problem: Skin Integrity:  Goal: Absence of new skin breakdown  Description  Absence of new skin breakdown  Outcome: Met This Shift

## 2020-02-06 NOTE — PROGRESS NOTES
7:13 AM  Consult placed through [employer] text for Dr. Daryle Abernethy; regarding this patient.   Wilma Pizarro, TAE/TATIANA  2/6/2020  Read @ 7:13 AM

## 2020-02-06 NOTE — ED NOTES
GILBERTO faxed and confirmed receipt by Dk Vines.  Awaiting admission to floor     Annamarie Greene RN  02/05/20 1925

## 2020-02-06 NOTE — CONSULTS
[address]MED[address]  [address] [facility] [address]  Dept: [phone]  Loc: [phone]  Attending Consult Note      Reason for Visit: Recurrent metastatic uterine cancer. PCP:  No primary care provider on file. History of Present Illness: The patient is a 66-year-old lady, with a past medical history significant for hypertension, diabetes mellitus, thyroid disease, hyperlipidemia, and bipolar disorder, who was diagnosed with stage IIIB endometrial serous adenocarcinoma in 2016, she is s/p PAOLA with BSO, pelvic/aortic lymphadenectomy, she had her surgery done in [place], was followed by chemotherapy and radiation therapy, she had her treatment at [facility] in [place] [address], the treatement records are not available at this time. She had presented to [facility] on 2019 with right lower extremity pain and edema, she had bilateral lower extremity venous ultrasound, revealing noncompressible filling defect in the common femoral vein, deep femoral vein, popliteal and great saphenous vein. She had a chest CTA done, revealing lung nodules, right lung cavitary mass in the lower lobe, as well as filling defect in the left lung. The patient underwent on 2019 a CT-guided biopsy of the right lower lobe cavitary mass, pathology was consistent with adenocarcinoma, p53 positive, CD10 positive, PAX 8+, these findings in conjunction with morphologic features support an adenocarcinoma consistent with origin from an endometrial primary. She was also found to have a pelvic mass, obstructive uropathy, had right nephrostomy tube placed. The patient's son lives locally, he brought to [facility] yesterday, she had a CT abd/pelvis done revealin. Irregular 4.9 cm cavitary mass in the posterior inferior right lung  base. 2. Severe left hydronephrosis and hydroureter and massive distention  of the urinary bladder.   3. Large central pelvic mass at the expected location of the vagina,  extending down to the inferior vaginal margin. This mass appears to be  involving the posterior bladder margin and anterior rectal wall. Extrinsic compression and high-grade obstruction of the bowel at the  rectosigmoid junction. 4. Malignant lymphadenopathy adjacent to the rectosigmoid junction and  extending along the inferior mesenteric artery. 5. Right nephrostomy catheter, no right hydronephrosis but the right  kidney shows abnormal cortical nephrogram suggesting pyelonephritis. 6. A 7 mm nonobstructing mid right renal calculus. 7. Numerous clips along the aortic bifurcation and upper pelvic  vessels and lateral pelvis suggesting lymph node dissection. 8. Skin thickening of the buttock area overlying the coccyx and along  the posterior medial left inferior buttock area, but without any  obvious associated ulceration.     She had bilateral lower extremity venous ultrasounds done yesterday to 5/20/2020, revealing DVT, including occlusive intraluminal involvement of the right common femoral vein through the mid thigh, and nonocclusive thrombus more distally on the right, no evidence of left-sided DVT. Review of Systems;  CONSTITUTIONAL: No fever, chills. Decreased appetite and energy level. Lost about 30 pounds. ENMT: Eyes: No diplopia; Nose: No epistaxis. Mouth: No sore throat. RESPIRATORY: No hemoptysis, positive for shortness of breath on exertion, cough. CARDIOVASCULAR: No chest pain, palpitations. GASTROINTESTINAL: No nausea/vomiting, no abdominal pain. GENITOURINARY: No dysuria, urinary frequency, hematuria. : Positive for vaginal bleed for about a year. NEURO: No syncope, presyncope, headache.   Remainder:  ROS NEGATIVE    Past Medical History:      Diagnosis Date    Cancer ([facility] [address]     Diabetes mellitus ([facility] [address]     Thyroid disease      Patient Active Problem List   Diagnosis    Acute urinary retention    Pelvic mass    Hydronephrosis    Uncontrolled diabetes in the posterior inferior right lung base, severe left hydronephrosis and hydroureter and massive distention of the urinary bladder, large central pelvic mass at the expected location of the vagina, extending down to the inferior vaginal margin. This mass appears to be involving the posterior bladder margin and anterior rectal wall. Extrinsic compression and high-grade obstruction of the bowel at the rectosigmoid junction, malignant lymphadenopathy adjacent to the rectosigmoid junction and extending along the inferior mesenteric artery,right nephrostomy catheter.   She had bilateral lower extremity venous ultrasounds done yesterday to 5/20/2020, revealing DVT, including occlusive intraluminal involvement of the right common femoral vein through the mid thigh, and nonocclusive thrombus more distally on the right.    - Patient with recurrent metastatic endometrial cancer, she has a recurrence in the pelvis with obstructive uropathy, and biopsy-proven metastatic disease to the lungs, systemic palliative chemotherapy is recommended, will be started after discharge from the hospital, will order a repeat chest CT as baseline prior to starting treatemt, CA-125.   - RLE DVT, provoked by malignancy, continue Lovenox.  -Consult Palliative Medicine.  -Urology team has been consulted. -Anemia, probably secondary to vaginal bleed, and chronic inflammation, irons studies, Vit B12/folate ordered, continue to monitor her CBCD. Will continue to follow. Thank you for allowing us to participate in the care of [facility].     Justin Brown MD   HEMATOLOGY/MEDICAL ONCOLOGY  [facility] [address]  [address]  [address] [facility] [address]  Dept: [address]: [phone]

## 2020-02-06 NOTE — PROGRESS NOTES
[facility] Hospitalist   Progress Note    Admitting Date and Time: 2/5/2020  1:32 PM  Admit Dx: Acute urinary retention [R33.8]  Acute urinary retention [R33.8]    Subjective:    Pt is feeling fair this afternoon. Currently being seen by wound care. Son at bedside. FC and Nephrostomy tube patent. ROS: denies fever, chills, cp, sob, n/v, HA unless stated above.  sodium chloride flush  10 mL Intravenous 2 times per day    insulin glargine  10 Units Subcutaneous Nightly    insulin lispro  0-12 Units Subcutaneous TID WC    insulin lispro  0-6 Units Subcutaneous Nightly    fenofibrate  160 mg Oral Daily    levothyroxine  50 mcg Oral Daily    lisinopril  2.5 mg Oral Daily    lithium  300 mg Oral BID WC    acyclovir  400 mg Oral BID    enoxaparin  1 mg/kg Subcutaneous BID     morphine, 4 mg, Q4H PRN  glucose, 15 g, PRN  dextrose, 12.5 g, PRN  glucagon (rDNA), 1 mg, PRN  dextrose, 100 mL/hr, PRN  sodium chloride flush, 10 mL, PRN  magnesium hydroxide, 30 mL, Daily PRN  ondansetron, 4 mg, Q6H PRN         Objective:    BP (!) 114/50   Pulse 101   Temp 98.3 °F (36.8 °C) (Oral)   Resp 16   Ht 5' 2\" (1.575 m)   Wt 134 lb 14.4 oz (61.2 kg)   SpO2 97%   BMI 24.67 kg/m²   General Appearance: alert and oriented to person, place and time and in no acute distress  Skin: warm and dry. Stage II bilateral buttocks wounds with signs of healing noted. Head: normocephalic and atraumatic  Eyes: pupils equal, round, and reactive to light, extraocular eye movements intact, conjunctivae normal  Neck: neck supple and non tender without mass   Pulmonary/Chest: clear to auscultation bilaterally- no wheezes, rales or rhonchi, normal air movement, no respiratory distress  Cardiovascular: normal rate, normal S1 and S2. No rubs, gallops, or murmur noted Abdomen: soft, non-tender, non-distended, normal bowel sounds. No rebound, guarding. Or rigidity noted. Right Nephrostomy tube patent.     Extremities: no cyanosis, no clubbing and no edema  Neurologic: no cranial nerve deficit and speech normal      Recent Labs     02/05/20  1433 02/05/20  1504 02/06/20  0953   *  --  135   K 4.1  --  3.4*   CL 91*  --  105   CO2 21*  --  22   BUN 12  --  6*   CREATININE 0.8  --  0.7   GLUCOSE 472* 461 161*   CALCIUM 9.1  --  8.9       Recent Labs     02/05/20  1433 02/06/20  0953   WBC 10.9 9.7   RBC 3.35* 3.29*   HGB 8.2* 8.0*   HCT 27.5* 27.7*   MCV 82.1 84.2   MCH 24.5* 24.3*   MCHC 29.8* 28.9*   RDW 16.3* 16.3*    295   MPV 10.7 10.7           Radiology:   US DUP LOWER EXTREMITIES BILATERAL VENOUS   Final Result   Positive for DVT including occlusive intraluminal involvement of the   right common femoral vein through the mid thigh, and nonocclusive   thrombus is noted more distally on the right. No evidence of left-sided DVT. ALERT:  THIS IS AN ABNORMAL REPORT-positive for right leg DVT   extending to the common femoral vein. Note: The patient's nurse was contacted telephonically by myself at   the time of this dictation to communicate positive findings after   hours. XR PELVIS (1-2 VIEWS)   Final Result      No evidence of acute fracture. CT ABDOMEN PELVIS W IV CONTRAST Additional Contrast? None   Final Result   Addendum 1 of 1   Addendum: Complex findings discussed with Dr. Rola Wilson in the ER at   1720 hours 2/5/2020. Final      XR CHEST PORTABLE   Final Result      1. Atelectasis and/or infiltrate at the right lower lobe. 2. Questionable lung nodule abutting the left hilum. CT Chest WO Contrast    (Results Pending)       Assessment:  Active Problems:    Acute urinary retention    Pelvic mass    Hydronephrosis    Uncontrolled diabetes mellitus type 2 without complications (HCC)    Acidosis    Acute deep vein thrombosis (DVT) of right lower extremity (HCC)  Resolved Problems:    * No resolved hospital problems. *      Plan:  1.  Acute Urinary retention- Suspected

## 2020-02-07 ENCOUNTER — APPOINTMENT (OUTPATIENT)
Dept: GENERAL RADIOLOGY | Age: 70
DRG: 694 | End: 2020-02-07
Payer: MEDICAID

## 2020-02-07 LAB
ANION GAP SERPL CALCULATED.3IONS-SCNC: 11 MMOL/L (ref 7–16)
BUN BLDV-MCNC: 4 MG/DL (ref 8–23)
CALCIUM SERPL-MCNC: 9 MG/DL (ref 8.6–10.2)
CHLORIDE BLD-SCNC: 107 MMOL/L (ref 98–107)
CK MB: <1 NG/ML (ref 0–4.3)
CO2: 19 MMOL/L (ref 22–29)
CREAT SERPL-MCNC: 0.6 MG/DL (ref 0.5–1)
FERRITIN: 81 NG/ML
FOLATE: 4.9 NG/ML (ref 4.8–24.2)
GFR AFRICAN AMERICAN: >60
GFR NON-AFRICAN AMERICAN: >60 ML/MIN/1.73
GLUCOSE BLD-MCNC: 214 MG/DL (ref 74–99)
HCT VFR BLD CALC: 31.8 % (ref 34–48)
HEMOGLOBIN: 9.1 G/DL (ref 11.5–15.5)
IRON SATURATION: 19 % (ref 15–50)
IRON: 27 MCG/DL (ref 37–145)
MAGNESIUM: 1.8 MG/DL (ref 1.6–2.6)
MCH RBC QN AUTO: 24.6 PG (ref 26–35)
MCHC RBC AUTO-ENTMCNC: 28.6 % (ref 32–34.5)
MCV RBC AUTO: 85.9 FL (ref 80–99.9)
METER GLUCOSE: 128 MG/DL (ref 74–99)
METER GLUCOSE: 178 MG/DL (ref 74–99)
METER GLUCOSE: 230 MG/DL (ref 74–99)
METER GLUCOSE: 261 MG/DL (ref 74–99)
PDW BLD-RTO: 16.3 FL (ref 11.5–15)
PHOSPHORUS: 2 MG/DL (ref 2.5–4.5)
PLATELET # BLD: 286 E9/L (ref 130–450)
PMV BLD AUTO: 10.9 FL (ref 7–12)
POTASSIUM SERPL-SCNC: 3.4 MMOL/L (ref 3.5–5)
RBC # BLD: 3.7 E12/L (ref 3.5–5.5)
SODIUM BLD-SCNC: 137 MMOL/L (ref 132–146)
TOTAL CK: 31 U/L (ref 20–180)
TOTAL IRON BINDING CAPACITY: 139 MCG/DL (ref 250–450)
TROPONIN: <0.01 NG/ML (ref 0–0.03)
VITAMIN B-12: 676 PG/ML (ref 211–946)
WBC # BLD: 9.2 E9/L (ref 4.5–11.5)

## 2020-02-07 PROCEDURE — 82746 ASSAY OF FOLIC ACID SERUM: CPT

## 2020-02-07 PROCEDURE — 85027 COMPLETE CBC AUTOMATED: CPT

## 2020-02-07 PROCEDURE — 84100 ASSAY OF PHOSPHORUS: CPT

## 2020-02-07 PROCEDURE — 82550 ASSAY OF CK (CPK): CPT

## 2020-02-07 PROCEDURE — 6360000002 HC RX W HCPCS: Performed by: NURSE PRACTITIONER

## 2020-02-07 PROCEDURE — 6370000000 HC RX 637 (ALT 250 FOR IP): Performed by: NURSE PRACTITIONER

## 2020-02-07 PROCEDURE — 2060000000 HC ICU INTERMEDIATE R&B

## 2020-02-07 PROCEDURE — 82553 CREATINE MB FRACTION: CPT

## 2020-02-07 PROCEDURE — 6360000002 HC RX W HCPCS: Performed by: INTERNAL MEDICINE

## 2020-02-07 PROCEDURE — 84484 ASSAY OF TROPONIN QUANT: CPT

## 2020-02-07 PROCEDURE — 83540 ASSAY OF IRON: CPT

## 2020-02-07 PROCEDURE — 2580000003 HC RX 258: Performed by: NURSE PRACTITIONER

## 2020-02-07 PROCEDURE — 6370000000 HC RX 637 (ALT 250 FOR IP): Performed by: INTERNAL MEDICINE

## 2020-02-07 PROCEDURE — 82607 VITAMIN B-12: CPT

## 2020-02-07 PROCEDURE — 97165 OT EVAL LOW COMPLEX 30 MIN: CPT

## 2020-02-07 PROCEDURE — 36415 COLL VENOUS BLD VENIPUNCTURE: CPT

## 2020-02-07 PROCEDURE — 99233 SBSQ HOSP IP/OBS HIGH 50: CPT | Performed by: INTERNAL MEDICINE

## 2020-02-07 PROCEDURE — 99232 SBSQ HOSP IP/OBS MODERATE 35: CPT | Performed by: INTERNAL MEDICINE

## 2020-02-07 PROCEDURE — 82962 GLUCOSE BLOOD TEST: CPT

## 2020-02-07 PROCEDURE — 83550 IRON BINDING TEST: CPT

## 2020-02-07 PROCEDURE — 71045 X-RAY EXAM CHEST 1 VIEW: CPT

## 2020-02-07 PROCEDURE — 80048 BASIC METABOLIC PNL TOTAL CA: CPT

## 2020-02-07 PROCEDURE — 82728 ASSAY OF FERRITIN: CPT

## 2020-02-07 PROCEDURE — 2580000003 HC RX 258: Performed by: INTERNAL MEDICINE

## 2020-02-07 PROCEDURE — 93005 ELECTROCARDIOGRAM TRACING: CPT | Performed by: INTERNAL MEDICINE

## 2020-02-07 PROCEDURE — 83735 ASSAY OF MAGNESIUM: CPT

## 2020-02-07 RX ORDER — POTASSIUM CHLORIDE 20 MEQ/1
40 TABLET, EXTENDED RELEASE ORAL ONCE
Status: COMPLETED | OUTPATIENT
Start: 2020-02-07 | End: 2020-02-07

## 2020-02-07 RX ORDER — POTASSIUM CHLORIDE 20 MEQ/1
40 TABLET, EXTENDED RELEASE ORAL ONCE
Status: DISCONTINUED | OUTPATIENT
Start: 2020-02-07 | End: 2020-02-07 | Stop reason: SDUPTHER

## 2020-02-07 RX ADMIN — PETROLATUM: 420 OINTMENT TOPICAL at 07:30

## 2020-02-07 RX ADMIN — LITHIUM CARBONATE 300 MG: 300 CAPSULE, GELATIN COATED ORAL at 16:06

## 2020-02-07 RX ADMIN — INSULIN GLARGINE 10 UNITS: 100 INJECTION, SOLUTION SUBCUTANEOUS at 20:35

## 2020-02-07 RX ADMIN — SODIUM CHLORIDE: 9 INJECTION, SOLUTION INTRAVENOUS at 10:41

## 2020-02-07 RX ADMIN — SODIUM CHLORIDE, PRESERVATIVE FREE 500 UNITS: 5 INJECTION INTRAVENOUS at 09:37

## 2020-02-07 RX ADMIN — MORPHINE SULFATE 4 MG: 4 INJECTION, SOLUTION INTRAMUSCULAR; INTRAVENOUS at 14:58

## 2020-02-07 RX ADMIN — LEVOTHYROXINE SODIUM 50 MCG: 50 TABLET ORAL at 06:19

## 2020-02-07 RX ADMIN — LISINOPRIL 2.5 MG: 2.5 TABLET ORAL at 09:34

## 2020-02-07 RX ADMIN — MORPHINE SULFATE 4 MG: 4 INJECTION, SOLUTION INTRAMUSCULAR; INTRAVENOUS at 06:19

## 2020-02-07 RX ADMIN — ACYCLOVIR 400 MG: 200 CAPSULE ORAL at 20:36

## 2020-02-07 RX ADMIN — POTASSIUM CHLORIDE 40 MEQ: 20 TABLET, EXTENDED RELEASE ORAL at 18:25

## 2020-02-07 RX ADMIN — Medication 10 ML: at 09:36

## 2020-02-07 RX ADMIN — LITHIUM CARBONATE 300 MG: 300 CAPSULE, GELATIN COATED ORAL at 07:30

## 2020-02-07 RX ADMIN — Medication 10 ML: at 17:41

## 2020-02-07 RX ADMIN — PETROLATUM: 420 OINTMENT TOPICAL at 20:55

## 2020-02-07 RX ADMIN — ENOXAPARIN SODIUM 60 MG: 60 INJECTION SUBCUTANEOUS at 09:34

## 2020-02-07 RX ADMIN — INSULIN LISPRO 2 UNITS: 100 INJECTION, SOLUTION INTRAVENOUS; SUBCUTANEOUS at 16:05

## 2020-02-07 RX ADMIN — ENOXAPARIN SODIUM 60 MG: 60 INJECTION SUBCUTANEOUS at 23:25

## 2020-02-07 RX ADMIN — INSULIN LISPRO 3 UNITS: 100 INJECTION, SOLUTION INTRAVENOUS; SUBCUTANEOUS at 20:35

## 2020-02-07 RX ADMIN — INSULIN LISPRO 4 UNITS: 100 INJECTION, SOLUTION INTRAVENOUS; SUBCUTANEOUS at 11:13

## 2020-02-07 RX ADMIN — ACYCLOVIR 400 MG: 200 CAPSULE ORAL at 09:34

## 2020-02-07 RX ADMIN — FENOFIBRATE 160 MG: 160 TABLET ORAL at 09:34

## 2020-02-07 RX ADMIN — ALTEPLASE 1 MG: 2.2 INJECTION, POWDER, LYOPHILIZED, FOR SOLUTION INTRAVENOUS at 20:36

## 2020-02-07 RX ADMIN — MORPHINE SULFATE 4 MG: 4 INJECTION, SOLUTION INTRAMUSCULAR; INTRAVENOUS at 10:41

## 2020-02-07 ASSESSMENT — PAIN DESCRIPTION - PAIN TYPE
TYPE: ACUTE PAIN
TYPE: ACUTE PAIN

## 2020-02-07 ASSESSMENT — PAIN SCALES - GENERAL
PAINLEVEL_OUTOF10: 9
PAINLEVEL_OUTOF10: 0
PAINLEVEL_OUTOF10: 10
PAINLEVEL_OUTOF10: 0
PAINLEVEL_OUTOF10: 3
PAINLEVEL_OUTOF10: 10

## 2020-02-07 ASSESSMENT — PAIN DESCRIPTION - ORIENTATION
ORIENTATION: RIGHT;LEFT;MID
ORIENTATION: RIGHT;LEFT;MID

## 2020-02-07 ASSESSMENT — PAIN DESCRIPTION - LOCATION
LOCATION: BUTTOCKS;BACK
LOCATION: BUTTOCKS

## 2020-02-07 ASSESSMENT — PAIN DESCRIPTION - ONSET
ONSET: ON-GOING
ONSET: ON-GOING

## 2020-02-07 ASSESSMENT — PAIN DESCRIPTION - FREQUENCY
FREQUENCY: CONTINUOUS
FREQUENCY: CONTINUOUS

## 2020-02-07 ASSESSMENT — PAIN - FUNCTIONAL ASSESSMENT
PAIN_FUNCTIONAL_ASSESSMENT: PREVENTS OR INTERFERES SOME ACTIVE ACTIVITIES AND ADLS
PAIN_FUNCTIONAL_ASSESSMENT: PREVENTS OR INTERFERES SOME ACTIVE ACTIVITIES AND ADLS

## 2020-02-07 ASSESSMENT — PAIN DESCRIPTION - PROGRESSION
CLINICAL_PROGRESSION: NOT CHANGED
CLINICAL_PROGRESSION: GRADUALLY WORSENING

## 2020-02-07 ASSESSMENT — PAIN DESCRIPTION - DESCRIPTORS
DESCRIPTORS: ACHING;DISCOMFORT;CONSTANT
DESCRIPTORS: ACHING;DISCOMFORT;CONSTANT

## 2020-02-07 NOTE — PLAN OF CARE
Problem: Risk for Impaired Skin Integrity  Goal: Tissue integrity - skin and mucous membranes  Description  Structural intactness and normal physiological function of skin and  mucous membranes.   2/7/2020 1752 by Jose Armando Tarango RN  Outcome: Met This Shift  2/7/2020 0847 by Jose Armando Tarango RN  Outcome: Met This Shift  2/7/2020 0412 by Jian Watson RN  Outcome: Met This Shift     Problem: Falls - Risk of:  Goal: Will remain free from falls  Description  Will remain free from falls  2/7/2020 1752 by Jose Armando Tarango RN  Outcome: Met This Shift  2/7/2020 0847 by Jose Armando Tarango RN  Outcome: Met This Shift  2/7/2020 0412 by Jian Watson RN  Outcome: Met This Shift  Goal: Absence of physical injury  Description  Absence of physical injury  2/7/2020 1752 by Jose Armando Tarango RN  Outcome: Met This Shift  2/7/2020 0847 by Jose Armando Tarango RN  Outcome: Met This Shift  2/7/2020 0412 by Jian Watson RN  Outcome: Met This Shift     Problem: Pain:  Goal: Pain level will decrease  Description  Pain level will decrease  2/7/2020 1752 by Jose Armando Tarango RN  Outcome: Met This Shift  2/7/2020 0847 by Jose Armando Tarango RN  Outcome: Met This Shift  Goal: Control of acute pain  Description  Control of acute pain  2/7/2020 1752 by Jose Armando Tarango RN  Outcome: Met This Shift  2/7/2020 0847 by Jose Armando Tarango RN  Outcome: Met This Shift  2/7/2020 0412 by Jian Watson RN  Outcome: Met This Shift  Goal: Control of chronic pain  Description  Control of chronic pain  2/7/2020 1752 by Jose Armando Tarango RN  Outcome: Met This Shift  2/7/2020 0847 by Jose Armando Tarango RN  Outcome: Met This Shift     Problem: Skin Integrity:  Goal: Absence of new skin breakdown  Description  Absence of new skin breakdown  2/7/2020 1752 by Jose Armando Tarango RN  Outcome: Met This Shift  2/7/2020 0847 by Jose Armando Tarango RN  Outcome: Met This Shift

## 2020-02-07 NOTE — PROGRESS NOTES
Physical Therapy    Facility/Department: [facility]  INTERMEDIATE [facility]      NAME: Bart Link  : 1950  MRN: [id number]    Attempted to see pt for PT evaluation, pt/family declining at this time stating it hurts her too much to move. Encouraged pt to participate, continued to refuse.   Ileana PT [id number]

## 2020-02-07 NOTE — PROGRESS NOTES
2/7/2020 8:40 AM  Service: Urology  Group: [facility] (Shon/Jose Elias/Haim)    Isaac Keen  [id number]    Subjective:    She is awake and alert  Resting comfortably in bed  She denies left flank pain   Denies nausea or emesis   Denies fever or chills  She is tolerating the santo, draining clear yellow urine  Tolerating right PNT, also draining clear yellow urine  Had right PNT placed [facility] in [place] in December 2019 and she is unaware of what future plans were for the PNT.      Review of Systems  Constitutional: No fever or chills   Respiratory: negative for cough and hemoptysis  Cardiovascular: negative for chest pain and dyspnea  Gastrointestinal: negative for abdominal pain, diarrhea, nausea and vomiting   : See above  Derm: negative for rash and skin lesion(s)  Neurological: negative for seizures and tremors  Musculoskeletal: sacral pain from wound     Psychiatric: Negative   All other reviews are negative      Scheduled Meds:   Petrolatum   Topical BID    heparin flush  5 mL Intracatheter BID    sodium chloride flush  10 mL Intravenous 2 times per day    insulin glargine  10 Units Subcutaneous Nightly    insulin lispro  0-12 Units Subcutaneous TID WC    insulin lispro  0-6 Units Subcutaneous Nightly    fenofibrate  160 mg Oral Daily    levothyroxine  50 mcg Oral Daily    lisinopril  2.5 mg Oral Daily    lithium  300 mg Oral BID WC    acyclovir  400 mg Oral BID    enoxaparin  1 mg/kg Subcutaneous BID       Objective:  Vitals:    02/07/20 0727   BP: (!) 141/63   Pulse: 96   Resp: 16   Temp: 97.3 °F (36.3 °C)   SpO2: 98%         Allergies: Codeine and Sulfa antibiotics    General Appearance: alert and oriented to person, place and time and in no acute distress  Skin: no rash or erythema  Head: normocephalic and atraumatic  Pulmonary/Chest: normal air movement, no respiratory distress  Abdomen: soft, non-tender, non-distended  Genitourinary: santo catheter intact draining clear yellow

## 2020-02-07 NOTE — PROGRESS NOTES
I spoke with Phuc Swanson NP from urology, we tried to send for pt for procedure but she ate at [phone], patient requests anesthesia. We will send for pt to be done at main on Monday 2- late am. I will verify time with Anesthesia in am on Monday and keep RN updated.

## 2020-02-07 NOTE — PROGRESS NOTES
Inpatient Medical Oncology Progress Note    Subjective:  No nausea. No fever. Objective:  BP (!) 132/48   Pulse 97   Temp 98.1 °F (36.7 °C) (Oral)   Resp 16   Ht 5' 2\" (1.575 m)   Wt 135 lb 1.6 oz (61.3 kg)   SpO2 97%   BMI 24.71 kg/m²   GENERAL: Alert, oriented x 3, not in acute distress. HEENT: PERRLA; EOMI. Oropharynx clear. NECK: Supple. No palpable cervical or supraclavicular lymphadenopathy. LUNGS: Decreased air entry bilaterally. CARDIOVASCULAR: Regular rate. No murmurs, rubs or gallops. ABDOMEN: Distended,NABS, firmness and tenderness in the lower abdomen. EXTREMITIES: Positive for right lower leg edema. NEUROLOGIC: No focal deficits. ECOG PS 2    Diagnostics:  Lab Results   Component Value Date    WBC 9.7 02/06/2020    HGB 8.0 (L) 02/06/2020    HCT 27.7 (L) 02/06/2020    MCV 84.2 02/06/2020     02/06/2020     Lab Results   Component Value Date     02/06/2020    K 3.4 (L) 02/06/2020     02/06/2020    CO2 22 02/06/2020    BUN 6 (L) 02/06/2020    CREATININE 0.7 02/06/2020    GLUCOSE 161 (H) 02/06/2020    CALCIUM 8.9 02/06/2020    PROT 6.9 02/05/2020    LABALBU 2.6 (L) 02/06/2020    BILITOT 0.2 02/05/2020    ALKPHOS 110 (H) 02/05/2020    AST 12 02/05/2020    ALT 6 02/05/2020    LABGLOM >60 02/06/2020    GFRAA >60 02/06/2020     Impression/Plan:  78 y/o recurrent metastatic endometrial cancer, she has a recurrence in the pelvis with obstructive uropathy, and biopsy-proven metastatic disease to the lungs, systemic palliative chemotherapy is recommended, will be started after discharge from the hospital.  Repeat chest CT 02/06/2020 Multiple lung masses consistent with metastatic disease. Mild mediastinal LN. CA-125 52 on 02/06/2020. RLE DVT, provoked by malignancy, continue Lovenox.   Urology team consult appreciated; Right stent to be internalized next week  Will need left PNT if the hydronephrosis does not resolve with the santo  Anemia, probably secondary to vaginal

## 2020-02-07 NOTE — PROGRESS NOTES
Occupational Therapy  OCCUPATIONAL THERAPY INITIAL EVALUATION      Date:2020  Patient Name: Ilir Lindo  MRN: [id number]  : 1950  Room: [address] [place], [place] [address]T    Referring Provider:  Dr Soniya Warner DO    Evaluating OT: Bhakti Rosas OTR/L [id number]    AM-PAC Daily Activity Raw Score:     Recommended Adaptive Equipment: TBD     Diagnosis: Acute urinary retention    Pertinent Medical History: sacral and buttock wound, uterine cancer with mets to her left lung. Precautions:  Falls, nephrostomy, alarm      Home Living: Pt lives with son and grandson, trailor, couple steps to enter. Equipment owned: rollator. Daughter reports patient was Independent, active and driving till 2019 - has been declining since. Daughter plans to have patient return to her home (1 story with 4 steps/2 rails to enter)    Pain Level: no pain this session ;   Cognition:  Marsa Allegra and conversing.  Following commands   Encouragement to particiapte in OOB activity     Judgement/safety:  Fair               Memory [place] [address]     Functional Assessment:   Initial Eval Status  Date: 20 Tx Session  STGs = LTGs  1-2 weeks    Feeding Set-up      Grooming Min A,seated   Supervision    UB Dressing Mod A   SBA   LB Dressing Max A  Total assist to don/doff socks   Min A   Bathing Max A  Min A   Toileting Assist with hygiene   Loose bowel mvt     Bed Mobility  Mod A   Supine to sit      Functional Transfers Min A  Sit-stand from bed, chair     Supervision    Functional Mobility Min A,w/walker   Steps from bed to chair only   SBA with good tolerance    Balance Sitting:     Static:  CGA/SBA - EOB     Standing: Min A     Activity Tolerance Poor+  Light activity   Good  with ADL activity   Visual/  Perceptual Glasses: yes                 Right t  hand dominant    Strength ROM Additional Info:    RUE  3+/5  WFL good  and wfl FMC/dexterity noted during ADL tasks       LUE 3+/5  WFL good  and wfl FMC/dexterity noted during ADL tasks medical information, gathering information on past medical history/social history and prior level of function, completion of standardized testing/informal observation of tasks, assessment of data, and development of POC/Goals      Phyllistine Due OTR/L [id number]

## 2020-02-07 NOTE — PROGRESS NOTES
Spoke w/ IR [place] and pt will need to be NPO before procedure if she wants general anesthesia. Pt does request general anesthesia vs. Local. Per IR procedure will be Monday.

## 2020-02-08 LAB
INR BLD: 1.1
METER GLUCOSE: 243 MG/DL (ref 74–99)
METER GLUCOSE: 255 MG/DL (ref 74–99)
METER GLUCOSE: 262 MG/DL (ref 74–99)
METER GLUCOSE: 271 MG/DL (ref 74–99)
PROTHROMBIN TIME: 13.2 SEC (ref 9.3–12.4)

## 2020-02-08 PROCEDURE — 2060000000 HC ICU INTERMEDIATE R&B

## 2020-02-08 PROCEDURE — 99356 PR PROLONGED SVC I/P OR OBS SETTING 1ST HOUR: CPT | Performed by: PHYSICIAN ASSISTANT

## 2020-02-08 PROCEDURE — 36415 COLL VENOUS BLD VENIPUNCTURE: CPT

## 2020-02-08 PROCEDURE — 6370000000 HC RX 637 (ALT 250 FOR IP): Performed by: NURSE PRACTITIONER

## 2020-02-08 PROCEDURE — 6360000002 HC RX W HCPCS: Performed by: NURSE PRACTITIONER

## 2020-02-08 PROCEDURE — 99232 SBSQ HOSP IP/OBS MODERATE 35: CPT | Performed by: INTERNAL MEDICINE

## 2020-02-08 PROCEDURE — 6370000000 HC RX 637 (ALT 250 FOR IP): Performed by: STUDENT IN AN ORGANIZED HEALTH CARE EDUCATION/TRAINING PROGRAM

## 2020-02-08 PROCEDURE — 2580000003 HC RX 258: Performed by: STUDENT IN AN ORGANIZED HEALTH CARE EDUCATION/TRAINING PROGRAM

## 2020-02-08 PROCEDURE — 85610 PROTHROMBIN TIME: CPT

## 2020-02-08 PROCEDURE — 99233 SBSQ HOSP IP/OBS HIGH 50: CPT | Performed by: PHYSICIAN ASSISTANT

## 2020-02-08 PROCEDURE — 87040 BLOOD CULTURE FOR BACTERIA: CPT

## 2020-02-08 PROCEDURE — 2580000003 HC RX 258: Performed by: INTERNAL MEDICINE

## 2020-02-08 PROCEDURE — 6360000002 HC RX W HCPCS: Performed by: STUDENT IN AN ORGANIZED HEALTH CARE EDUCATION/TRAINING PROGRAM

## 2020-02-08 PROCEDURE — 6360000002 HC RX W HCPCS: Performed by: INTERNAL MEDICINE

## 2020-02-08 PROCEDURE — 6370000000 HC RX 637 (ALT 250 FOR IP): Performed by: PHYSICIAN ASSISTANT

## 2020-02-08 PROCEDURE — 82962 GLUCOSE BLOOD TEST: CPT

## 2020-02-08 RX ORDER — MORPHINE SULFATE 15 MG/1
7.5 TABLET ORAL EVERY 4 HOURS PRN
Status: DISCONTINUED | OUTPATIENT
Start: 2020-02-08 | End: 2020-02-08 | Stop reason: SDUPTHER

## 2020-02-08 RX ORDER — NYSTATIN 100000 U/G
OINTMENT TOPICAL 2 TIMES DAILY
Status: DISCONTINUED | OUTPATIENT
Start: 2020-02-08 | End: 2020-02-12 | Stop reason: HOSPADM

## 2020-02-08 RX ORDER — METOCLOPRAMIDE HYDROCHLORIDE 5 MG/ML
10 INJECTION INTRAMUSCULAR; INTRAVENOUS EVERY 6 HOURS PRN
Status: DISCONTINUED | OUTPATIENT
Start: 2020-02-08 | End: 2020-02-12

## 2020-02-08 RX ORDER — MORPHINE SULFATE 30 MG/1
15 TABLET ORAL EVERY 4 HOURS PRN
Status: DISCONTINUED | OUTPATIENT
Start: 2020-02-08 | End: 2020-02-12

## 2020-02-08 RX ORDER — SENNA AND DOCUSATE SODIUM 50; 8.6 MG/1; MG/1
2 TABLET, FILM COATED ORAL 2 TIMES DAILY
Status: DISCONTINUED | OUTPATIENT
Start: 2020-02-08 | End: 2020-02-12 | Stop reason: HOSPADM

## 2020-02-08 RX ORDER — ACETAMINOPHEN 325 MG/1
650 TABLET ORAL EVERY 4 HOURS PRN
Status: DISCONTINUED | OUTPATIENT
Start: 2020-02-08 | End: 2020-02-12 | Stop reason: HOSPADM

## 2020-02-08 RX ORDER — SODIUM CHLORIDE 9 MG/ML
INJECTION, SOLUTION INTRAVENOUS EVERY 8 HOURS
Status: DISCONTINUED | OUTPATIENT
Start: 2020-02-08 | End: 2020-02-12 | Stop reason: HOSPADM

## 2020-02-08 RX ORDER — METOCLOPRAMIDE HYDROCHLORIDE 5 MG/ML
10 INJECTION INTRAMUSCULAR; INTRAVENOUS ONCE
Status: COMPLETED | OUTPATIENT
Start: 2020-02-08 | End: 2020-02-08

## 2020-02-08 RX ADMIN — LEVOTHYROXINE SODIUM 50 MCG: 50 TABLET ORAL at 06:33

## 2020-02-08 RX ADMIN — NYSTATIN OINTMENT: 100000 OINTMENT TOPICAL at 21:25

## 2020-02-08 RX ADMIN — ACETAMINOPHEN 650 MG: 325 TABLET ORAL at 14:30

## 2020-02-08 RX ADMIN — PIPERACILLIN AND TAZOBACTAM 3.38 G: 3; .375 INJECTION, POWDER, FOR SOLUTION INTRAVENOUS at 18:21

## 2020-02-08 RX ADMIN — INSULIN LISPRO 6 UNITS: 100 INJECTION, SOLUTION INTRAVENOUS; SUBCUTANEOUS at 16:17

## 2020-02-08 RX ADMIN — ENOXAPARIN SODIUM 60 MG: 60 INJECTION SUBCUTANEOUS at 10:56

## 2020-02-08 RX ADMIN — PIPERACILLIN AND TAZOBACTAM 3.38 G: 3; .375 INJECTION, POWDER, FOR SOLUTION INTRAVENOUS at 10:57

## 2020-02-08 RX ADMIN — FENOFIBRATE 160 MG: 160 TABLET ORAL at 08:00

## 2020-02-08 RX ADMIN — ACYCLOVIR 400 MG: 200 CAPSULE ORAL at 08:00

## 2020-02-08 RX ADMIN — ACYCLOVIR 400 MG: 200 CAPSULE ORAL at 21:25

## 2020-02-08 RX ADMIN — PETROLATUM: 420 OINTMENT TOPICAL at 21:25

## 2020-02-08 RX ADMIN — LITHIUM CARBONATE 300 MG: 300 CAPSULE, GELATIN COATED ORAL at 21:25

## 2020-02-08 RX ADMIN — INSULIN GLARGINE 10 UNITS: 100 INJECTION, SOLUTION SUBCUTANEOUS at 21:49

## 2020-02-08 RX ADMIN — ONDANSETRON 4 MG: 2 INJECTION INTRAMUSCULAR; INTRAVENOUS at 21:26

## 2020-02-08 RX ADMIN — MORPHINE SULFATE 4 MG: 4 INJECTION, SOLUTION INTRAMUSCULAR; INTRAVENOUS at 08:00

## 2020-02-08 RX ADMIN — SENNOSIDES AND DOCUSATE SODIUM 2 TABLET: 8.6; 5 TABLET ORAL at 16:17

## 2020-02-08 RX ADMIN — INSULIN LISPRO 6 UNITS: 100 INJECTION, SOLUTION INTRAVENOUS; SUBCUTANEOUS at 06:44

## 2020-02-08 RX ADMIN — MORPHINE SULFATE 4 MG: 4 INJECTION, SOLUTION INTRAMUSCULAR; INTRAVENOUS at 00:03

## 2020-02-08 RX ADMIN — LITHIUM CARBONATE 300 MG: 300 CAPSULE, GELATIN COATED ORAL at 11:22

## 2020-02-08 RX ADMIN — NYSTATIN OINTMENT: 100000 OINTMENT TOPICAL at 11:22

## 2020-02-08 RX ADMIN — LISINOPRIL 2.5 MG: 2.5 TABLET ORAL at 08:00

## 2020-02-08 RX ADMIN — METOCLOPRAMIDE 10 MG: 5 INJECTION, SOLUTION INTRAMUSCULAR; INTRAVENOUS at 10:57

## 2020-02-08 RX ADMIN — INSULIN LISPRO 9 UNITS: 100 INJECTION, SOLUTION INTRAVENOUS; SUBCUTANEOUS at 11:05

## 2020-02-08 RX ADMIN — MORPHINE SULFATE 4 MG: 4 INJECTION, SOLUTION INTRAMUSCULAR; INTRAVENOUS at 12:41

## 2020-02-08 RX ADMIN — PETROLATUM: 420 OINTMENT TOPICAL at 09:00

## 2020-02-08 RX ADMIN — Medication 10 ML: at 08:00

## 2020-02-08 RX ADMIN — SENNOSIDES AND DOCUSATE SODIUM 2 TABLET: 8.6; 5 TABLET ORAL at 21:26

## 2020-02-08 RX ADMIN — SODIUM CHLORIDE: 9 INJECTION, SOLUTION INTRAVENOUS at 21:27

## 2020-02-08 RX ADMIN — ONDANSETRON 4 MG: 2 INJECTION INTRAMUSCULAR; INTRAVENOUS at 06:45

## 2020-02-08 RX ADMIN — MORPHINE SULFATE 15 MG: 30 TABLET ORAL at 16:17

## 2020-02-08 ASSESSMENT — PAIN DESCRIPTION - PAIN TYPE
TYPE: CHRONIC PAIN
TYPE: CHRONIC PAIN

## 2020-02-08 ASSESSMENT — PAIN SCALES - GENERAL
PAINLEVEL_OUTOF10: 6
PAINLEVEL_OUTOF10: 5
PAINLEVEL_OUTOF10: 0
PAINLEVEL_OUTOF10: 0
PAINLEVEL_OUTOF10: 7
PAINLEVEL_OUTOF10: 5
PAINLEVEL_OUTOF10: 6

## 2020-02-08 ASSESSMENT — PAIN - FUNCTIONAL ASSESSMENT: PAIN_FUNCTIONAL_ASSESSMENT: PREVENTS OR INTERFERES WITH MANY ACTIVE NOT PASSIVE ACTIVITIES

## 2020-02-08 ASSESSMENT — PAIN DESCRIPTION - DESCRIPTORS: DESCRIPTORS: DULL;DISCOMFORT

## 2020-02-08 ASSESSMENT — PAIN DESCRIPTION - LOCATION
LOCATION: BACK;GENERALIZED
LOCATION: GENERALIZED

## 2020-02-08 ASSESSMENT — PAIN DESCRIPTION - ORIENTATION: ORIENTATION: LOWER

## 2020-02-08 NOTE — PROGRESS NOTES
Palliative Care Department  Palliative Care Progress  Provider: Chaparrita Cruz [facility] Day: 4    Referring Provider:  Dr. Gita Maldonado    Reason for Consult:  []  Code status Discussion  []  Assist with goals of care  []  Psychosocial support  [x]  Symptom Management  []  Advanced Care Planning    Chief Complaint: Rafaela Montano is a 79 y.o. female with chief complaint of sacral wound, lower abdominal pain, weakness and tachycardia. Subjective:   2/8/2020  Patient feeling better today. Pain better controlled. Continues to use PRN morphine  She did have nausea this morning and was initially treated with Zofran without relief, Reglan was then given and did provide her relief  She has not had a bowel movement but feels she has to go. Her daughter-in-law is at bedside. We did discuss levels of CODE STATUS and patient wishes to think about it and discuss further with her family. HPI from Consult 2/6/2020 Triston Abrams Shahnaz:  Dora Toscano is a 79 y.o. female with significant past medical history of hypertension, DM, thyroid disease, hyperlipidemia, bipolar disorder, and stage IIIB endometrial serous adenocarcinoma in 2016, s/p BSO, pelvis/aortic lymphadenectomy, chemotherapy and radiation-treatment was at [facility] in [place], [place] who presented with sacral wound, lower abdominal pain, weakness, and tachycardia beginning a few days ago. Family reported that patient's last hospitalization was in the [place] area around [place] time. At that time, patient was diagnosed with a right lower extremity DVT and placed on Eliquis. The family also stated that over the past month, patient has become increasingly weak. She has sacral ulcers that are now painful and draining. Patient also has had some dark bloody vaginal discharge and abdominal bloating. Patient also has a right nephrostomy tube due to obstructive uropathy. Daughter-in-law reported that patient's urine has been dark and cloudy.   Lab work in ED significant for hemoglobin 8.2, hematocrit 27.5, sodium 122, chloride 91, glucose 472, and beta-hydroxybutyrate 0.76. CT scan abdomen pelvis significant for 4.9 cm cavitary mass in the right lung base, large central pelvic mass at the location of vagina, and malignant lymphadenopathy adjacent to the rectosigmoid junction and extending along the inferior mesenteric artery. Ultrasound of bilateral lower extremities positive for DVT including occlusive intraluminal involvement of the right common femoral vein through the mid thigh and nonocclusive thrombus noted more distally on the right. Chart review shows that patient underwent a CT-guided biopsy of the right lower lobe cavitary mass on 12/20/2019-pathology consistent with adenocarcinoma. Hematology oncology and urology following. Patient seen, son at bedside. Patient alert and oriented and son, Gera Ponce, very involved in conversation. Patient was living in [place] with her oldest son when she called her younger son, Gera Ponce, complaining of not being taken care of. Gera Ponce then drove to pick his mom up and brought her straight to the hospital. Patient is going to move in with Gera Ponce and his wife, Marco Walton, upon discharge. Gera Ponce explains that they will refuse for patient to go to any type of facility besides his home. Marco Walton is a retired nurse aide and call provide total care for patient however they would like [facility] to be available if needed a couple times/week. Gera Ponce is going to find all new physicians for his mom in [facility] as he feels she was not appropriately managed in [place]. We also had a discussion regarding CODE STATUS. At this time, patient would like to remain a full code. Patient is also  interested in completing HCPOA paperwork. She would like her son, Gera Ponce, to be HCPOA and her daughter, Korin Doherty, to be alternative agent. At this time, patient denies symptoms besides buttocks pain from wound.  She states she has been receiving pain medication and local wound care which has been helping. At this time, patient and Lennette Krabbe deny any other pall med needs. Support offered. Will continue to follow. \"     Advance directives: family/patient have paperwork to complete  Surrogate/Legal NOK: Child    Contacts:  Lennette Krabbe, abdirahman, [phone]     Past Medical History:   Diagnosis Date    Cancer ([facility]     Diabetes mellitus ([facility]     Thyroid disease        Past Surgical History:   Procedure Laterality Date    HYSTERECTOMY         History reviewed. No pertinent family history.     Social history:  [place] status: no  Marital status: no  Living status: recent move to [address] [place] [address] be living with son and daughter in law   Work history: unknown    Allergies   Allergen Reactions    Codeine     Sulfa Antibiotics      Current Medications:  Inpatient medications reviewed: yes  Home Medications reviewed: yes    Objective:     Physical Exam  BP (!) 145/64   Pulse 116   Temp 100.7 °F (38.2 °C) (Oral)   Resp 20   Ht 5' 2\" (1.575 m)   Wt 135 lb 11.2 oz (61.6 kg)   SpO2 97%   BMI 24.82 kg/m²     Gen:  Alert, appears stated age, pale, ill-appearing, in no acute distress  HEENT:  Normocephalic, conjunctiva pink, sclera anicteric, mucosa moist, edentulous  Neck:  Supple  Lungs:  CTA bilaterally, no audible rhonchi or wheezes noted  Heart[de-identified]  Tachycardic, no murmur, rub, or gallop noted during exam  Abd:  Hyperactive bowel sounds, soft, non tender, suprapubic area with fullness  Ext:  Moving all extremities, no edema, pulses present  Skin:  Warm and dry, pale  Neuro:  PERRL, Alert, oriented x 3; following commands    Results/Verification of Data Review  Objective data reviewed: labs, images, records, medication use, vitals and chart    Active Hospital Problems    Diagnosis Date Noted    Moderate protein-calorie malnutrition ([facility] [E44.0] 02/06/2020    Acute urinary retention [R33.8] 02/05/2020    Pelvic mass [R19.00] 02/05/2020    Hydronephrosis [N13.30] 02/05/2020    Uncontrolled diabetes mellitus type 2 without complications ([facility] [C27.28] 02/05/2020    Acidosis [E87.2]     Acute deep vein thrombosis (DVT) of right lower extremity (HCC) [I82.401]        Assessment/Plan   Pain associated with neoplasm:  -Patient utilizing IV morphine as needed for pain  -Provide oral medication in attempts to prepare for discharge    Bulky pelvic mass  -Patient's bulky mass appears to be causing multiple issues due to extrinsic compression. She is experiencing obstructive uropathy as well as colonic obstruction. Concerns that bulkiness of mass will cause recurrent issues and prevent patient from starting systemic treatments which is the only thing that will decrease size of the mass. Question diverting colostomy, suprapubic catheter? to avoid bowel obstruction, recurrent urinary tract infections with urinary retention due to obstructive uropathy. Nausea:  -Reglan appeared to alleviate the patient's nausea more significantly than Zofran, continue Reglan 10 mg IV as needed and may wish to provide patient with a scheduled dose    Recurrent metastatic endometrial cancer: Lung metastasis, lymph node metastasis  -Plan to begin palliative chemotherapy on discharge      DVT right lower extremity:  -Lovenox    Obstructive uropathy:  -Urology planning stent placement 2/9    - Referrals to: none today    Time/Communication  Greater than 51% of time spent, total 65 minutes in counseling and coordination of care at the bedside regarding goals of care, symptom management, diagnosis and prognosis and see above. Hermila Armendariz PA-C  Palliative Medicine    Discussed patient and the plan of care with the other IDT members of Palliative Care, and with patient, family and floor nurse. Thank you for allowing Palliative Medicine to participate in the care of Brittany Felipe. Note: This report was completed using computerize voiced recognition software.   Every effort has been made to ensure accuracy; however, inadvertent computerized transcription errors may be present.

## 2020-02-08 NOTE — PROGRESS NOTES
[facility]  PROGRESS NOTE    Chief Complaint:  Right renal calculus/Bilateral renal cysts/Bilateral hydronephrosis/Urinary retention/UTI    HPI: She is weak and tired but comfortable. Her daughter-in-law is at her bedside. She is not having any pain at this time. She is planning on having palliative chemotherapy but not radiation. She moved here from [facility] to live with her son in [place]. Vitals:    02/08/20 1555   BP: 135/63   Pulse: 110   Resp: 18   Temp: 99.6 °F (37.6 °C)   SpO2:        Allergies: Codeine and Sulfa antibiotics    PAST MEDICAL HISTORY:   Past Medical History:   Diagnosis Date    [facility] [address]     Diabetes mellitus ([facility] [address]     Thyroid disease    Metastatic endometrial cancer    PAST SURGICAL HISTORY:   Past Surgical History:   Procedure Laterality Date    HYSTERECTOMY          PAST FAMILY HISTORY:  History reviewed. No pertinent family history.     PAST SOCIAL HISTORY:    Social History     Socioeconomic History    Marital status:      Spouse name: None    Number of children: None    Years of education: None    Highest education level: None   Occupational History    None   Social Needs    Financial resource strain: None    Food insecurity:     Worry: None     Inability: None    Transportation needs:     Medical: None     Non-medical: None   Tobacco Use    Smoking status: Former Smoker    Smokeless tobacco: Never Used   Substance and Sexual Activity    Alcohol use: Never     Frequency: Never    Drug use: Never    Sexual activity: None   Lifestyle    Physical activity:     Days per week: None     Minutes per session: None    Stress: None   Relationships    Social connections:     Talks on phone: None     Gets together: None     Attends Orthodoxy service: None     Active member of club or organization: None     Attends meetings of clubs or organizations: None     Relationship status: None    Intimate partner violence:     Fear of current or ex partner: None     Emotionally abused: None     Physically abused: None     Forced sexual activity: None   Other Topics Concern    None   Social History Narrative    None       IV:    sodium chloride      sodium chloride 75 mL/hr at 02/07/20 1041    dextrose         PRN: acetaminophen, metoclopramide, [DISCONTINUED] morphine **OR** morphine, heparin flush, morphine, glucose, dextrose, glucagon (rDNA), dextrose, sodium chloride flush, magnesium hydroxide, ondansetron    Scheduled:    nystatin   Topical BID    piperacillin-tazobactam  3.375 g Intravenous Q8H    insulin lispro  0-18 Units Subcutaneous TID WC    sennosides-docusate sodium  2 tablet Oral BID    Petrolatum   Topical BID    heparin flush  5 mL Intracatheter BID    sodium chloride flush  10 mL Intravenous 2 times per day    insulin glargine  10 Units Subcutaneous Nightly    fenofibrate  160 mg Oral Daily    levothyroxine  50 mcg Oral Daily    lisinopril  2.5 mg Oral Daily    lithium  300 mg Oral BID WC    acyclovir  400 mg Oral BID    enoxaparin  1 mg/kg Subcutaneous BID       Lab Results   Component Value Date     02/07/2020    K 3.4 02/07/2020    K 3.4 02/06/2020    BUN 4 02/07/2020    CREATININE 0.6 02/07/2020        Lab Results   Component Value Date    HGB 9.1 02/07/2020    HCT 31.8 02/07/2020       Review Of Systems:  Constitutional: Weak and tired  Eyes: negative  Ears, nose, mouth, throat, and face: negative  Respiratory: negative  Cardiovascular: negative  Gastrointestinal: negative  Genitourinary: Kidney stone  Musculoskeletal: negative  Neurological: negative  Behavioral/Psych: negative  Endocrine: Low thyroid    Physical Exam:  Skin dry, without rashes  Respirations non-labored, intact  Abdomen soft, non-tender, non-distended. Active bowel sounds.   Alert and oriented x3  Saleh and right nephrostomy tube are draining clear, yellow urine      Assessment and Plan:  Right renal calculus/Bilateral renal cysts/Bilateral hydronephrosis/Urinary retention/UTI  -Urine culture on 2/5/2020 is growing gram-negative rods. Continue broad-spectrum antibiotics  -Continue the Saleh  -The right renal stone will be managed nonoperatively at this time  -She is tentatively planned to be transferred to [facility] this Monday for interventional radiology placement of a right antegrade ureteral stent and right nephrostomy tube removal.  Her left kidney will be assessed at that time. A left nephrostomy tube and stent may also be placed at that time by interventional radiology if the hydronephrosis in the left kidney has not resolved since her Saleh was placed last week  -She is being followed by Dr. Hari Matos for palliative chemotherapy for recurrent metastatic endometrial cancer. She has a poor long-term prognosis  -We will follow her during her hospital stay.       Adri Troy MD  2/8/2020  5:31 PM

## 2020-02-08 NOTE — PROGRESS NOTES
[facility] Progress Note    Admitting Date and Time: 2/5/2020  1:32 PM  Admit Dx: Acute urinary retention [R33.8]  Acute urinary retention [R33.8]    Subjective:  Patient is being followed for Acute urinary retention [R33.8]  Acute urinary retention [R33.8]   Pt feels nauseous still after receiving some Zofran. Patient otherwise denies any dysuria, pelvic pain, pelvic pressure, flank pain. Patient denies any fevers, chills, chest pain. Per RN: Family member wants an antifungal cream for a yeast infection. Family member was not aware of multiple lung masses seen on CT chest.    ROS: denies fever, chills, cp, sob, n/v, HA unless stated above.      nystatin   Topical BID    piperacillin-tazobactam  3.375 g Intravenous Q8H    metoclopramide  10 mg Intravenous Once    Petrolatum   Topical BID    heparin flush  5 mL Intracatheter BID    sodium chloride flush  10 mL Intravenous 2 times per day    insulin glargine  10 Units Subcutaneous Nightly    insulin lispro  0-12 Units Subcutaneous TID WC    insulin lispro  0-6 Units Subcutaneous Nightly    fenofibrate  160 mg Oral Daily    levothyroxine  50 mcg Oral Daily    lisinopril  2.5 mg Oral Daily    lithium  300 mg Oral BID WC    acyclovir  400 mg Oral BID    enoxaparin  1 mg/kg Subcutaneous BID     heparin flush, 5 mL, PRN  morphine, 4 mg, Q4H PRN  glucose, 15 g, PRN  dextrose, 12.5 g, PRN  glucagon (rDNA), 1 mg, PRN  dextrose, 100 mL/hr, PRN  sodium chloride flush, 10 mL, PRN  magnesium hydroxide, 30 mL, Daily PRN  ondansetron, 4 mg, Q6H PRN     Objective:  BP (!) 175/67   Pulse 114   Temp 98.8 °F (37.1 °C) (Oral)   Resp 20   Ht 5' 2\" (1.575 m)   Wt 135 lb 11.2 oz (61.6 kg)   SpO2 99%   BMI 24.82 kg/m²   General Appearance: alert and oriented to person, place and time and in no acute distress  Neck: neck supple and non tender without mass   Pulmonary/Chest: clear to auscultation bilaterally- no wheezes, rales or rhonchi, normal air movement, no respiratory distress  Cardiovascular: normal rate, normal S1 and S2 and no carotid bruits  Extremities: +1 pitting edema bilaterally    Recent Labs     02/05/20  1433 02/05/20  1504 02/06/20  0953 02/07/20  1713   *  --  135 137   K 4.1  --  3.4* 3.4*   CL 91*  --  105 107   CO2 21*  --  22 19*   BUN 12  --  6* 4*   CREATININE 0.8  --  0.7 0.6   GLUCOSE 472* 461 161* 214*   CALCIUM 9.1  --  8.9 9.0       Recent Labs     02/05/20  1433 02/06/20  0953 02/07/20  1713   WBC 10.9 9.7 9.2   RBC 3.35* 3.29* 3.70   HGB 8.2* 8.0* 9.1*   HCT 27.5* 27.7* 31.8*   MCV 82.1 84.2 85.9   MCH 24.5* 24.3* 24.6*   MCHC 29.8* 28.9* 28.6*   RDW 16.3* 16.3* 16.3*    295 286   MPV 10.7 10.7 10.9     Radiology:   XR CHEST PORTABLE   Final Result      NO ACUTE CARDIOPULMONARY PROCESS         CT Chest WO Contrast   Final Result      1. Multiple lung masses consistent with metastatic disease. 2. The largest of these is at the posterior right lower lobe measuring   5.7 cm with irregular central cavitation. 3. The largest left lung mass is 1.9 cm in the superior segment left   lower lobe. 4. A tiny right pleural effusion. 5. Thyroid nodules. Largest is on the left. 6. Mild mediastinal adenopathy. US DUP LOWER EXTREMITIES BILATERAL VENOUS   Final Result   Positive for DVT including occlusive intraluminal involvement of the   right common femoral vein through the mid thigh, and nonocclusive   thrombus is noted more distally on the right. No evidence of left-sided DVT. ALERT:  THIS IS AN ABNORMAL REPORT-positive for right leg DVT   extending to the common femoral vein. Note: The patient's nurse was contacted telephonically by myself at   the time of this dictation to communicate positive findings after   hours. XR PELVIS (1-2 VIEWS)   Final Result      No evidence of acute fracture.       CT ABDOMEN PELVIS W IV CONTRAST Additional Contrast? None   Final Result Addendum 1 of 1   Addendum: Complex findings discussed with Dr. Denise Wilkes in the ER at   1720 hours 2/5/2020. Final      XR CHEST PORTABLE   Final Result      1. Atelectasis and/or infiltrate at the right lower lobe. 2. Questionable lung nodule abutting the left hilum. IR Interventional Radiology Procedure Request    (Results Pending)     Assessment:    Active Problems:    Acute urinary retention    Pelvic mass    Hydronephrosis    Uncontrolled diabetes mellitus type 2 without complications (HCC)    Acidosis    Acute deep vein thrombosis (DVT) of right lower extremity (HCC)    Moderate protein-calorie malnutrition (HCC)  Resolved Problems:    * No resolved hospital problems. *    Plan:  1. Pelvic mass likely malignancy with mets to the lungs with multiple masses. Oncology consulted, input appreciated. Palliative following as well. 2.  Hydronephrosis with hydroureter, urology consulted input appreciated, plan for surgery next week. 3.  Urine culture growing gram negative rods. Patient has a history of multiple UTIs per daughter-in-law. Will start Zosyn due to multiple UTIs in the past.  Daughter-in-law is unaware if she is ever had ESBL. Follow urine culture. Will get microbiology results from prior hospital in [place]. Will consider ID consult if cultures grow ESBL. 4.  Hyponatremia and hypokalemia have improved along with her dehydration. 5.  Hypothyroidism; continue Synthroid  6. DM type II; controlled. Insulin, SSI  7. Bipolar disorder; continue home meds  8. Right lower extremity DVT; continue anticoagulation  9.   Moderate protein calorie malnutrition; dietitian consulted    Jovanni Hawkins MD

## 2020-02-09 LAB
ANION GAP SERPL CALCULATED.3IONS-SCNC: 8 MMOL/L (ref 7–16)
BUN BLDV-MCNC: 8 MG/DL (ref 8–23)
CALCIUM SERPL-MCNC: 8.8 MG/DL (ref 8.6–10.2)
CHLORIDE BLD-SCNC: 109 MMOL/L (ref 98–107)
CO2: 26 MMOL/L (ref 22–29)
CREAT SERPL-MCNC: 0.9 MG/DL (ref 0.5–1)
GFR AFRICAN AMERICAN: >60
GFR NON-AFRICAN AMERICAN: >60 ML/MIN/1.73
GLUCOSE BLD-MCNC: 202 MG/DL (ref 74–99)
HCT VFR BLD CALC: 29.3 % (ref 34–48)
HEMOGLOBIN: 8.3 G/DL (ref 11.5–15.5)
MCH RBC QN AUTO: 24.6 PG (ref 26–35)
MCHC RBC AUTO-ENTMCNC: 28.3 % (ref 32–34.5)
MCV RBC AUTO: 86.9 FL (ref 80–99.9)
METER GLUCOSE: 128 MG/DL (ref 74–99)
METER GLUCOSE: 134 MG/DL (ref 74–99)
METER GLUCOSE: 170 MG/DL (ref 74–99)
METER GLUCOSE: 238 MG/DL (ref 74–99)
ORGANISM: ABNORMAL
PDW BLD-RTO: 16.5 FL (ref 11.5–15)
PLATELET # BLD: 340 E9/L (ref 130–450)
PMV BLD AUTO: 10 FL (ref 7–12)
POTASSIUM SERPL-SCNC: 3.5 MMOL/L (ref 3.5–5)
RBC # BLD: 3.37 E12/L (ref 3.5–5.5)
SODIUM BLD-SCNC: 143 MMOL/L (ref 132–146)
URINE CULTURE, ROUTINE: ABNORMAL
WBC # BLD: 6.5 E9/L (ref 4.5–11.5)

## 2020-02-09 PROCEDURE — 36415 COLL VENOUS BLD VENIPUNCTURE: CPT

## 2020-02-09 PROCEDURE — 6370000000 HC RX 637 (ALT 250 FOR IP): Performed by: NURSE PRACTITIONER

## 2020-02-09 PROCEDURE — 6360000002 HC RX W HCPCS: Performed by: STUDENT IN AN ORGANIZED HEALTH CARE EDUCATION/TRAINING PROGRAM

## 2020-02-09 PROCEDURE — 2580000003 HC RX 258: Performed by: STUDENT IN AN ORGANIZED HEALTH CARE EDUCATION/TRAINING PROGRAM

## 2020-02-09 PROCEDURE — 6360000002 HC RX W HCPCS: Performed by: INTERNAL MEDICINE

## 2020-02-09 PROCEDURE — 6370000000 HC RX 637 (ALT 250 FOR IP): Performed by: PHYSICIAN ASSISTANT

## 2020-02-09 PROCEDURE — 2060000000 HC ICU INTERMEDIATE R&B

## 2020-02-09 PROCEDURE — 2580000003 HC RX 258: Performed by: INTERNAL MEDICINE

## 2020-02-09 PROCEDURE — 6360000002 HC RX W HCPCS: Performed by: PHYSICIAN ASSISTANT

## 2020-02-09 PROCEDURE — 82962 GLUCOSE BLOOD TEST: CPT

## 2020-02-09 PROCEDURE — 6360000002 HC RX W HCPCS: Performed by: NURSE PRACTITIONER

## 2020-02-09 PROCEDURE — 85027 COMPLETE CBC AUTOMATED: CPT

## 2020-02-09 PROCEDURE — 99232 SBSQ HOSP IP/OBS MODERATE 35: CPT | Performed by: INTERNAL MEDICINE

## 2020-02-09 PROCEDURE — 80048 BASIC METABOLIC PNL TOTAL CA: CPT

## 2020-02-09 PROCEDURE — 6370000000 HC RX 637 (ALT 250 FOR IP): Performed by: SPECIALIST

## 2020-02-09 PROCEDURE — 6370000000 HC RX 637 (ALT 250 FOR IP): Performed by: STUDENT IN AN ORGANIZED HEALTH CARE EDUCATION/TRAINING PROGRAM

## 2020-02-09 RX ORDER — CEPHALEXIN 500 MG/1
500 CAPSULE ORAL EVERY 8 HOURS SCHEDULED
Status: DISCONTINUED | OUTPATIENT
Start: 2020-02-09 | End: 2020-02-10

## 2020-02-09 RX ADMIN — LEVOTHYROXINE SODIUM 50 MCG: 50 TABLET ORAL at 06:04

## 2020-02-09 RX ADMIN — ACYCLOVIR 400 MG: 200 CAPSULE ORAL at 09:11

## 2020-02-09 RX ADMIN — PETROLATUM: 420 OINTMENT TOPICAL at 09:12

## 2020-02-09 RX ADMIN — PIPERACILLIN AND TAZOBACTAM 3.38 G: 3; .375 INJECTION, POWDER, FOR SOLUTION INTRAVENOUS at 09:59

## 2020-02-09 RX ADMIN — MORPHINE SULFATE 15 MG: 30 TABLET ORAL at 04:22

## 2020-02-09 RX ADMIN — SENNOSIDES AND DOCUSATE SODIUM 2 TABLET: 8.6; 5 TABLET ORAL at 09:44

## 2020-02-09 RX ADMIN — METOCLOPRAMIDE 10 MG: 5 INJECTION, SOLUTION INTRAMUSCULAR; INTRAVENOUS at 19:43

## 2020-02-09 RX ADMIN — LITHIUM CARBONATE 300 MG: 300 CAPSULE, GELATIN COATED ORAL at 11:21

## 2020-02-09 RX ADMIN — ONDANSETRON 4 MG: 2 INJECTION INTRAMUSCULAR; INTRAVENOUS at 17:53

## 2020-02-09 RX ADMIN — NYSTATIN OINTMENT: 100000 OINTMENT TOPICAL at 09:11

## 2020-02-09 RX ADMIN — CEPHALEXIN 500 MG: 500 CAPSULE ORAL at 15:41

## 2020-02-09 RX ADMIN — MORPHINE SULFATE 4 MG: 4 INJECTION, SOLUTION INTRAMUSCULAR; INTRAVENOUS at 19:51

## 2020-02-09 RX ADMIN — MORPHINE SULFATE 4 MG: 4 INJECTION, SOLUTION INTRAMUSCULAR; INTRAVENOUS at 09:35

## 2020-02-09 RX ADMIN — MORPHINE SULFATE 4 MG: 4 INJECTION, SOLUTION INTRAMUSCULAR; INTRAVENOUS at 23:55

## 2020-02-09 RX ADMIN — Medication 10 ML: at 19:43

## 2020-02-09 RX ADMIN — MORPHINE SULFATE 4 MG: 4 INJECTION, SOLUTION INTRAMUSCULAR; INTRAVENOUS at 15:51

## 2020-02-09 RX ADMIN — FENOFIBRATE 160 MG: 160 TABLET ORAL at 09:11

## 2020-02-09 RX ADMIN — INSULIN LISPRO 6 UNITS: 100 INJECTION, SOLUTION INTRAVENOUS; SUBCUTANEOUS at 06:41

## 2020-02-09 RX ADMIN — SODIUM CHLORIDE, PRESERVATIVE FREE 500 UNITS: 5 INJECTION INTRAVENOUS at 22:03

## 2020-02-09 RX ADMIN — SODIUM CHLORIDE: 9 INJECTION, SOLUTION INTRAVENOUS at 06:06

## 2020-02-09 RX ADMIN — PIPERACILLIN AND TAZOBACTAM 3.38 G: 3; .375 INJECTION, POWDER, FOR SOLUTION INTRAVENOUS at 02:28

## 2020-02-09 RX ADMIN — LISINOPRIL 2.5 MG: 2.5 TABLET ORAL at 09:11

## 2020-02-09 RX ADMIN — Medication 10 ML: at 09:11

## 2020-02-09 ASSESSMENT — PAIN - FUNCTIONAL ASSESSMENT
PAIN_FUNCTIONAL_ASSESSMENT: INTOLERABLE, UNABLE TO DO ANY ACTIVE OR PASSIVE ACTIVITIES
PAIN_FUNCTIONAL_ASSESSMENT: PREVENTS OR INTERFERES WITH MANY ACTIVE NOT PASSIVE ACTIVITIES
PAIN_FUNCTIONAL_ASSESSMENT: PREVENTS OR INTERFERES SOME ACTIVE ACTIVITIES AND ADLS
PAIN_FUNCTIONAL_ASSESSMENT: PREVENTS OR INTERFERES WITH ALL ACTIVE AND SOME PASSIVE ACTIVITIES

## 2020-02-09 ASSESSMENT — PAIN DESCRIPTION - ONSET
ONSET: GRADUAL
ONSET: GRADUAL

## 2020-02-09 ASSESSMENT — PAIN SCALES - GENERAL
PAINLEVEL_OUTOF10: 10

## 2020-02-09 ASSESSMENT — PAIN DESCRIPTION - DESCRIPTORS
DESCRIPTORS: PATIENT UNABLE TO DESCRIBE
DESCRIPTORS: PATIENT UNABLE TO DESCRIBE
DESCRIPTORS: DISCOMFORT
DESCRIPTORS: ACHING;DISCOMFORT;THROBBING

## 2020-02-09 ASSESSMENT — PAIN DESCRIPTION - PAIN TYPE
TYPE: CHRONIC PAIN
TYPE: ACUTE PAIN

## 2020-02-09 ASSESSMENT — PAIN DESCRIPTION - PROGRESSION
CLINICAL_PROGRESSION: GRADUALLY WORSENING
CLINICAL_PROGRESSION: GRADUALLY WORSENING

## 2020-02-09 ASSESSMENT — PAIN DESCRIPTION - ORIENTATION
ORIENTATION: LOWER

## 2020-02-09 ASSESSMENT — PAIN DESCRIPTION - FREQUENCY
FREQUENCY: CONTINUOUS
FREQUENCY: CONTINUOUS

## 2020-02-09 ASSESSMENT — PAIN DESCRIPTION - LOCATION
LOCATION: GENERALIZED
LOCATION: BACK;BUTTOCKS

## 2020-02-09 NOTE — CONSULTS
[address] [place] [address] [facility]  Consultation Note     Admit Date: 2/5/2020  1:32 PM    Reason for Consult:   Klebsiella UTI in setting of malignancy    Attending Physician:  Nikolas Kelly MD    HISTORY OF PRESENT ILLNESS:             The history is obtained from extensive review of available past medical records from [facility]. The patient is a 79 y.o. female who presents the ED at [facility] on 12/11/2020 with a boil on 1 of her buttocks that was debrided. This was treated with Doxycycline. A week later presented with pain in the right leg. Diagnosed and treated for DVT in the right lower extremity. A CT of the abdomen and pelvis showed a 4.8 cm cavitary mass in the right lung base and a new uterine mass, identified as endometrial cancer, as well as pulmonary embolism on a CT angiogram.  She was treated with Ceftriaxone. She was discharged only on Acyclovir. The patient has had radiation and chemotherapy. She presents now to the ED at [facility] on 2/5/2020 complaining of weakness. She has a left-sided nephrostomy tube. Her white count was normal and she was afebrile. She was noted to have a distended bladder so a Saleh catheter was inserted. Seen in consultation by oncology. The patient still has vaginal bleeding and was thought to be anemic because of this. Also seen by urology. They recommended to have a nephrostomy internalized. She was seen by wound ostomy. She was noted to have bilateral buttock lesions that were healing. A urine culture was done which is growing Klebsiella. ID was asked to see her in consultation. Zosyn was started. Has had a low-grade fever of 100.7 since she was admitted. She does have a Mediport that has been accessed. Blood cultures were also drawn and are negative so far. The patient's only complaint is having pain on her buttocks where she has those sores.     Past Medical History:        Diagnosis Date in the last 72 hours. No results for input(s): CULTRESP in the last 72 hours. No results for input(s): PROCAL in the last 72 hours. Assessment:  · Metastatic endometrial cancer  · Hydronephrosis right kidney. Status post nephrostomy tube  · Klebsiella bacteriuria. Possible complicated UTI associated during nephrostomy tube  · Bilateral buttock wounds  · Low-grade fever    Plan:    · No need for IV antibiotics. Change Zosyn over to oral Cephalexin  · Continue acyclovir prophylaxis  · Check cultures, baseline ESR, CRP  · Monitor labs  · Will follow with you    Thank you for having us see this patient in consultation. I will be discussing this case with the treating physicians. Spoke with daughter-in-law. They were not aware of the extent of the disease and the prognosis. They are seriously considering transitioning her to hospice.     Tabatha Flores  12:00 PM  2/9/2020

## 2020-02-09 NOTE — PROGRESS NOTES
Physical Therapy  PT chuckie attempted. Pt refused and RN states she's in a lot of pain and tomorrow would be better. Will re-attempt at later date.

## 2020-02-09 NOTE — PROGRESS NOTES
or rhonchi, normal air movement, no respiratory distress  Cardiovascular: normal rate, normal S1 and S2 and no carotid bruits  Extremities: +1 pitting edema bilaterally    Recent Labs     02/06/20  0953 02/07/20  1713    137   K 3.4* 3.4*    107   CO2 22 19*   BUN 6* 4*   CREATININE 0.7 0.6   GLUCOSE 161* 214*   CALCIUM 8.9 9.0       Recent Labs     02/06/20  0953 02/07/20  1713   WBC 9.7 9.2   RBC 3.29* 3.70   HGB 8.0* 9.1*   HCT 27.7* 31.8*   MCV 84.2 85.9   MCH 24.3* 24.6*   MCHC 28.9* 28.6*   RDW 16.3* 16.3*    286   MPV 10.7 10.9     Radiology:   XR CHEST PORTABLE   Final Result      NO ACUTE CARDIOPULMONARY PROCESS         CT Chest WO Contrast   Final Result      1. Multiple lung masses consistent with metastatic disease. 2. The largest of these is at the posterior right lower lobe measuring   5.7 cm with irregular central cavitation. 3. The largest left lung mass is 1.9 cm in the superior segment left   lower lobe. 4. A tiny right pleural effusion. 5. Thyroid nodules. Largest is on the left. 6. Mild mediastinal adenopathy. US DUP LOWER EXTREMITIES BILATERAL VENOUS   Final Result   Positive for DVT including occlusive intraluminal involvement of the   right common femoral vein through the mid thigh, and nonocclusive   thrombus is noted more distally on the right. No evidence of left-sided DVT. ALERT:  THIS IS AN ABNORMAL REPORT-positive for right leg DVT   extending to the common femoral vein. Note: The patient's nurse was contacted telephonically by myself at   the time of this dictation to communicate positive findings after   hours. XR PELVIS (1-2 VIEWS)   Final Result      No evidence of acute fracture. CT ABDOMEN PELVIS W IV CONTRAST Additional Contrast? None   Final Result   Addendum 1 of 1   Addendum: Complex findings discussed with Dr. Chance Nixon in the ER at   1720 hours 2/5/2020.       Final      XR CHEST PORTABLE   Final Result      1. Atelectasis and/or infiltrate at the right lower lobe. 2. Questionable lung nodule abutting the left hilum. IR Interventional Radiology Procedure Request    (Results Pending)     Assessment:    Active Problems:    Acute urinary retention    Pelvic mass    Hydronephrosis    Uncontrolled diabetes mellitus type 2 without complications (HCC)    Acidosis    Acute deep vein thrombosis (DVT) of right lower extremity (HCC)    Moderate protein-calorie malnutrition (HCC)    Palliative care by specialist    Pain, neoplasm-related    Goals of care, counseling/discussion    Nausea  Resolved Problems:    * No resolved hospital problems. *    Plan:  1. Endometrial cancer with mets to the lungs with multiple masses. Oncology consulted, input appreciated. Palliative following as well. Recommendation is palliative chemotherapy once discharged from the hospital.  2.  Hydronephrosis with hydroureter, urology consulted input appreciated, plan for ureter stents with IR at [facility]. 3.  Urine culture growing Klebsiella. Patient has a history of multiple UTIs per daughter-in-law. Continue Zosyn. Consult ID, input appreciated. 4.  Hyponatremia and hypokalemia have improved along with her dehydration. 5.  Hypothyroidism; continue Synthroid  6. DM type II; controlled. Insulin, SSI  7. Bipolar disorder; continue home meds  8. Right lower extremity DVT; continue anticoagulation  9.   Moderate protein calorie malnutrition; dietitian consulted     Janet Dempsey MD

## 2020-02-10 ENCOUNTER — TELEPHONE (OUTPATIENT)
Dept: ONCOLOGY | Age: 70
End: 2020-02-10

## 2020-02-10 LAB
ANION GAP SERPL CALCULATED.3IONS-SCNC: 9 MMOL/L (ref 7–16)
BLOOD CULTURE, ROUTINE: NORMAL
BUN BLDV-MCNC: 13 MG/DL (ref 8–23)
C-REACTIVE PROTEIN: 3.9 MG/DL (ref 0–0.4)
CALCIUM SERPL-MCNC: 8.8 MG/DL (ref 8.6–10.2)
CHLORIDE BLD-SCNC: 103 MMOL/L (ref 98–107)
CO2: 26 MMOL/L (ref 22–29)
CREAT SERPL-MCNC: 1 MG/DL (ref 0.5–1)
CULTURE, BLOOD 2: NORMAL
EKG ATRIAL RATE: 120 BPM
EKG P AXIS: 66 DEGREES
EKG P-R INTERVAL: 106 MS
EKG Q-T INTERVAL: 294 MS
EKG QRS DURATION: 62 MS
EKG QTC CALCULATION (BAZETT): 415 MS
EKG R AXIS: -6 DEGREES
EKG T AXIS: 90 DEGREES
EKG VENTRICULAR RATE: 120 BPM
GFR AFRICAN AMERICAN: >60
GFR NON-AFRICAN AMERICAN: 55 ML/MIN/1.73
GLUCOSE BLD-MCNC: 167 MG/DL (ref 74–99)
HCT VFR BLD CALC: 29.6 % (ref 34–48)
HEMOGLOBIN: 8.3 G/DL (ref 11.5–15.5)
MCH RBC QN AUTO: 24.5 PG (ref 26–35)
MCHC RBC AUTO-ENTMCNC: 28 % (ref 32–34.5)
MCV RBC AUTO: 87.3 FL (ref 80–99.9)
METER GLUCOSE: 258 MG/DL (ref 74–99)
METER GLUCOSE: 261 MG/DL (ref 74–99)
METER GLUCOSE: 271 MG/DL (ref 74–99)
PDW BLD-RTO: 16.8 FL (ref 11.5–15)
PLATELET # BLD: 383 E9/L (ref 130–450)
PMV BLD AUTO: 10 FL (ref 7–12)
POTASSIUM SERPL-SCNC: 4.1 MMOL/L (ref 3.5–5)
RBC # BLD: 3.39 E12/L (ref 3.5–5.5)
SEDIMENTATION RATE, ERYTHROCYTE: 59 MM/HR (ref 0–20)
SODIUM BLD-SCNC: 138 MMOL/L (ref 132–146)
WBC # BLD: 8.2 E9/L (ref 4.5–11.5)

## 2020-02-10 PROCEDURE — 80048 BASIC METABOLIC PNL TOTAL CA: CPT

## 2020-02-10 PROCEDURE — 82962 GLUCOSE BLOOD TEST: CPT

## 2020-02-10 PROCEDURE — 85651 RBC SED RATE NONAUTOMATED: CPT

## 2020-02-10 PROCEDURE — 2580000003 HC RX 258: Performed by: INTERNAL MEDICINE

## 2020-02-10 PROCEDURE — 2060000000 HC ICU INTERMEDIATE R&B

## 2020-02-10 PROCEDURE — 6360000002 HC RX W HCPCS: Performed by: INTERNAL MEDICINE

## 2020-02-10 PROCEDURE — 6370000000 HC RX 637 (ALT 250 FOR IP): Performed by: NURSE PRACTITIONER

## 2020-02-10 PROCEDURE — 2500000003 HC RX 250 WO HCPCS: Performed by: INTERNAL MEDICINE

## 2020-02-10 PROCEDURE — 6360000002 HC RX W HCPCS

## 2020-02-10 PROCEDURE — APPSS30 APP SPLIT SHARED TIME 16-30 MINUTES: Performed by: NURSE PRACTITIONER

## 2020-02-10 PROCEDURE — 85027 COMPLETE CBC AUTOMATED: CPT

## 2020-02-10 PROCEDURE — 36415 COLL VENOUS BLD VENIPUNCTURE: CPT

## 2020-02-10 PROCEDURE — 99233 SBSQ HOSP IP/OBS HIGH 50: CPT | Performed by: INTERNAL MEDICINE

## 2020-02-10 PROCEDURE — 86140 C-REACTIVE PROTEIN: CPT

## 2020-02-10 PROCEDURE — 6370000000 HC RX 637 (ALT 250 FOR IP): Performed by: INTERNAL MEDICINE

## 2020-02-10 PROCEDURE — 2700000000 HC OXYGEN THERAPY PER DAY

## 2020-02-10 PROCEDURE — 93010 ELECTROCARDIOGRAM REPORT: CPT | Performed by: INTERNAL MEDICINE

## 2020-02-10 PROCEDURE — 6360000002 HC RX W HCPCS: Performed by: NURSE PRACTITIONER

## 2020-02-10 RX ORDER — NALOXONE HYDROCHLORIDE 0.4 MG/ML
INJECTION, SOLUTION INTRAMUSCULAR; INTRAVENOUS; SUBCUTANEOUS
Status: COMPLETED
Start: 2020-02-10 | End: 2020-02-10

## 2020-02-10 RX ORDER — SODIUM CHLORIDE 450 MG/100ML
INJECTION, SOLUTION INTRAVENOUS CONTINUOUS
Status: DISCONTINUED | OUTPATIENT
Start: 2020-02-10 | End: 2020-02-12 | Stop reason: HOSPADM

## 2020-02-10 RX ORDER — 0.9 % SODIUM CHLORIDE 0.9 %
250 INTRAVENOUS SOLUTION INTRAVENOUS ONCE
Status: COMPLETED | OUTPATIENT
Start: 2020-02-10 | End: 2020-02-10

## 2020-02-10 RX ORDER — NALOXONE HYDROCHLORIDE 0.4 MG/ML
0.4 INJECTION, SOLUTION INTRAMUSCULAR; INTRAVENOUS; SUBCUTANEOUS ONCE
Status: COMPLETED | OUTPATIENT
Start: 2020-02-10 | End: 2020-02-10

## 2020-02-10 RX ORDER — CEPHALEXIN 500 MG/1
500 CAPSULE ORAL EVERY 8 HOURS SCHEDULED
Qty: 27 CAPSULE | Refills: 0 | Status: CANCELLED | OUTPATIENT
Start: 2020-02-10 | End: 2020-02-19

## 2020-02-10 RX ORDER — MORPHINE SULFATE 15 MG/1
15 TABLET ORAL EVERY 4 HOURS PRN
Qty: 18 TABLET | Refills: 0 | Status: CANCELLED | OUTPATIENT
Start: 2020-02-10 | End: 2020-02-13

## 2020-02-10 RX ORDER — METOPROLOL TARTRATE 5 MG/5ML
5 INJECTION INTRAVENOUS EVERY 6 HOURS PRN
Status: DISCONTINUED | OUTPATIENT
Start: 2020-02-10 | End: 2020-02-12 | Stop reason: HOSPADM

## 2020-02-10 RX ORDER — HALOPERIDOL 5 MG/ML
1 INJECTION INTRAMUSCULAR EVERY 6 HOURS PRN
Status: DISCONTINUED | OUTPATIENT
Start: 2020-02-10 | End: 2020-02-12 | Stop reason: HOSPADM

## 2020-02-10 RX ORDER — NYSTATIN 100000 U/G
OINTMENT TOPICAL
Qty: 1 TUBE | Refills: 0 | Status: CANCELLED | OUTPATIENT
Start: 2020-02-10 | End: 2020-02-15

## 2020-02-10 RX ADMIN — INSULIN GLARGINE 10 UNITS: 100 INJECTION, SOLUTION SUBCUTANEOUS at 21:18

## 2020-02-10 RX ADMIN — SODIUM CHLORIDE: 4.5 INJECTION, SOLUTION INTRAVENOUS at 21:40

## 2020-02-10 RX ADMIN — METOPROLOL TARTRATE 5 MG: 5 INJECTION, SOLUTION INTRAVENOUS at 18:53

## 2020-02-10 RX ADMIN — INSULIN LISPRO 3 UNITS: 100 INJECTION, SOLUTION INTRAVENOUS; SUBCUTANEOUS at 14:07

## 2020-02-10 RX ADMIN — NALOXONE HYDROCHLORIDE 0.4 MG: 0.4 INJECTION, SOLUTION INTRAMUSCULAR; INTRAVENOUS; SUBCUTANEOUS at 11:01

## 2020-02-10 RX ADMIN — ENOXAPARIN SODIUM 60 MG: 60 INJECTION SUBCUTANEOUS at 13:29

## 2020-02-10 RX ADMIN — ENOXAPARIN SODIUM 60 MG: 60 INJECTION SUBCUTANEOUS at 22:18

## 2020-02-10 RX ADMIN — Medication 500 UNITS: at 04:11

## 2020-02-10 RX ADMIN — METOPROLOL TARTRATE 5 MG: 5 INJECTION, SOLUTION INTRAVENOUS at 12:02

## 2020-02-10 RX ADMIN — MORPHINE SULFATE 4 MG: 4 INJECTION, SOLUTION INTRAMUSCULAR; INTRAVENOUS at 08:12

## 2020-02-10 RX ADMIN — NYSTATIN OINTMENT: 100000 OINTMENT TOPICAL at 13:29

## 2020-02-10 RX ADMIN — MORPHINE SULFATE 4 MG: 4 INJECTION, SOLUTION INTRAMUSCULAR; INTRAVENOUS at 04:06

## 2020-02-10 RX ADMIN — SODIUM CHLORIDE, PRESERVATIVE FREE 500 UNITS: 5 INJECTION INTRAVENOUS at 20:28

## 2020-02-10 RX ADMIN — SODIUM CHLORIDE 250 ML: 9 INJECTION, SOLUTION INTRAVENOUS at 08:32

## 2020-02-10 RX ADMIN — SODIUM CHLORIDE: 4.5 INJECTION, SOLUTION INTRAVENOUS at 12:02

## 2020-02-10 RX ADMIN — SODIUM CHLORIDE, PRESERVATIVE FREE 500 UNITS: 5 INJECTION INTRAVENOUS at 13:30

## 2020-02-10 RX ADMIN — INSULIN LISPRO 3 UNITS: 100 INJECTION, SOLUTION INTRAVENOUS; SUBCUTANEOUS at 16:51

## 2020-02-10 RX ADMIN — HYDROMORPHONE HYDROCHLORIDE 1 MG: 1 INJECTION, SOLUTION INTRAMUSCULAR; INTRAVENOUS; SUBCUTANEOUS at 06:25

## 2020-02-10 RX ADMIN — PETROLATUM: 420 OINTMENT TOPICAL at 13:29

## 2020-02-10 RX ADMIN — PETROLATUM: 420 OINTMENT TOPICAL at 21:19

## 2020-02-10 RX ADMIN — NYSTATIN OINTMENT: 100000 OINTMENT TOPICAL at 21:19

## 2020-02-10 ASSESSMENT — PAIN - FUNCTIONAL ASSESSMENT
PAIN_FUNCTIONAL_ASSESSMENT: PREVENTS OR INTERFERES WITH MANY ACTIVE NOT PASSIVE ACTIVITIES
PAIN_FUNCTIONAL_ASSESSMENT: PREVENTS OR INTERFERES WITH ALL ACTIVE AND SOME PASSIVE ACTIVITIES
PAIN_FUNCTIONAL_ASSESSMENT: INTOLERABLE, UNABLE TO DO ANY ACTIVE OR PASSIVE ACTIVITIES

## 2020-02-10 ASSESSMENT — PAIN SCALES - GENERAL
PAINLEVEL_OUTOF10: 10
PAINLEVEL_OUTOF10: 10
PAINLEVEL_OUTOF10: 0
PAINLEVEL_OUTOF10: 10

## 2020-02-10 ASSESSMENT — PAIN DESCRIPTION - PROGRESSION
CLINICAL_PROGRESSION: GRADUALLY WORSENING
CLINICAL_PROGRESSION: RAPIDLY WORSENING
CLINICAL_PROGRESSION: NOT CHANGED

## 2020-02-10 ASSESSMENT — PAIN DESCRIPTION - LOCATION
LOCATION: GENERALIZED

## 2020-02-10 ASSESSMENT — PAIN DESCRIPTION - FREQUENCY
FREQUENCY: CONTINUOUS

## 2020-02-10 ASSESSMENT — PAIN DESCRIPTION - DESCRIPTORS
DESCRIPTORS: PATIENT UNABLE TO DESCRIBE
DESCRIPTORS: PATIENT UNABLE TO DESCRIBE

## 2020-02-10 ASSESSMENT — PAIN DESCRIPTION - PAIN TYPE
TYPE: ACUTE PAIN

## 2020-02-10 ASSESSMENT — PAIN DESCRIPTION - ORIENTATION: ORIENTATION: LOWER;MID

## 2020-02-10 NOTE — PROGRESS NOTES
Spoke to [facility] to notify them that the procedure scheduled for tomorrow is cancelled.  Electronically signed by Mckinley Matthews RN on 2/9/2020 at 9:26 PM

## 2020-02-10 NOTE — PROGRESS NOTES
[facility] called in regards to pt going over to main hospital for IR procedure. They will set up in am with [facility] Anguiano once they know a time. If we find a time out before them please call and update them.

## 2020-02-10 NOTE — PROGRESS NOTES
Physical Therapy  RN held PT eval today. Pt/family may be considering Hospice. Will re-attempt at later date.

## 2020-02-10 NOTE — PROGRESS NOTES
[facility] Progress Note    Admitting Date and Time: 2/5/2020  1:32 PM  Admit Dx: Acute urinary retention [R33.8]  Acute urinary retention [R33.8]    Subjective:    Seen and examined  Son and daughter in law at bedside  Son visibly upset over having to make end of life decisions regarding his mother  Patient resting quietly, in no distress  Daughter in law states patient not eating or taking po meds. ROS: denies fever, chills, cp, sob, n/v, HA unless stated above.       metoprolol tartrate  25 mg Oral BID    cephALEXin  500 mg Oral 3 times per day    nystatin   Topical BID    insulin lispro  0-18 Units Subcutaneous TID WC    sennosides-docusate sodium  2 tablet Oral BID    Petrolatum   Topical BID    heparin flush  5 mL Intracatheter BID    sodium chloride flush  10 mL Intravenous 2 times per day    insulin glargine  10 Units Subcutaneous Nightly    fenofibrate  160 mg Oral Daily    levothyroxine  50 mcg Oral Daily    lisinopril  2.5 mg Oral Daily    lithium  300 mg Oral BID WC    acyclovir  400 mg Oral BID    enoxaparin  1 mg/kg Subcutaneous BID     haloperidol lactate, 1 mg, Q6H PRN  metoprolol, 5 mg, Q6H PRN  acetaminophen, 650 mg, Q4H PRN  metoclopramide, 10 mg, Q6H PRN  morphine, 15 mg, Q4H PRN  heparin flush, 5 mL, PRN  morphine, 4 mg, Q4H PRN  glucose, 15 g, PRN  dextrose, 12.5 g, PRN  glucagon (rDNA), 1 mg, PRN  dextrose, 100 mL/hr, PRN  sodium chloride flush, 10 mL, PRN  magnesium hydroxide, 30 mL, Daily PRN  ondansetron, 4 mg, Q6H PRN         Objective:    /60   Pulse 141   Temp 99.3 °F (37.4 °C) (Axillary)   Resp 18   Ht 5' 2\" (1.575 m)   Wt 136 lb 3.2 oz (61.8 kg)   SpO2 92%   BMI 24.91 kg/m²     General Appearance: alert and oriented to person,  in no acute distress  Skin: warm and dry  Head: normocephalic and atraumatic  Eyes: pupils equal, round, and reactive to light, extraocular eye movements intact, conjunctivae normal  Neck: neck supple and non tender without mass   Pulmonary/Chest: clear to auscultation bilaterally- no wheezes, rales or rhonchi, normal air movement, no respiratory distress  Cardiovascular: normal rate, normal S1 and S2 and no carotid bruits  Abdomen: soft, non-tender, non-distended, normal bowel sounds, no masses or organomegaly  Extremities: no cyanosis, no clubbing and no edema  Neurologic: no cranial nerve deficit and speech normal        Recent Labs     20  0810 02/10/20  0420    143 138   K 3.4* 3.5 4.1    109* 103   CO2 19* 26 26   BUN 4* 8 13   CREATININE 0.6 0.9 1.0   GLUCOSE 214* 202* 167*   CALCIUM 9.0 8.8 8.8       Recent Labs     20  0810 02/10/20  0420   WBC 9.2 6.5 8.2   RBC 3.70 3.37* 3.39*   HGB 9.1* 8.3* 8.3*   HCT 31.8* 29.3* 29.6*   MCV 85.9 86.9 87.3   MCH 24.6* 24.6* 24.5*   MCHC 28.6* 28.3* 28.0*   RDW 16.3* 16.5* 16.8*    340 383   MPV 10.9 10.0 10.0       Radiology: Xr Pelvis (1-2 Views)    Result Date: 2020  Single view of the pelvis. Diffuse bone demineralization. Saleh catheter overlies the pelvis. There is contrast within the urinary bladder. Multiple metallic clips overlie the lumbosacral spine and bilateral hips. Osteophytosis and eburnation of sacroiliac and acetabular articulating surfaces. Degenerative spondylosis and facet arthropathy are identified within the spine. Lucencies within the proximal right femur may be due to overlying support devices. No proximal right hip fracture could be identified on a CT of the abdomen and pelvis earlier today. No evidence of acute fracture. Ct Chest Wo Contrast    Result Date: 2020  Patient MRN:  [id number] : 1950 Age: 79 years Gender: Female Order Date:  2020 10:15 AM EXAM: CT CHEST WO CONTRAST INDICATION:  metastatic uterine cancer metastatic uterine cancer COMPARISON: Portable chest 2020. CT abdomen and pelvis 2020.  TECHNIQUE: Axial images through the chest without IV contrast. Sagittal and coronal 2-D reconstructions. Axial and sagittal lung windows. Dose reduction techniques were used. Contrast is none. Dose is total .42 MG Y CM. FINDINGS:  The thyroid is slightly enlarged. A 1.1 cm nodule in the right thyroid lobe. A large posterior left thyroid nodule approximately 3 cm long by 2.8 cm AP by 1.6 cm transverse. There is likely a 1 cm anterior left thyroid nodule. Moderate narrowing of the trachea in the transverse dimension at the level of the thyroid. Mild tracheal deviation to the right in the lower neck. An old right subcutaneous port with catheter tip in the cavoatrial junction. No pneumothorax. Multiple lung masses consistent with metastatic disease. The largest mass is in the posterior inferior right lower lobe, which was seen on the recent CT abdomen study. This is a thick irregular cavitary mass with irregular margins measuring approximately 5.7 cm transverse by 4.1 cm in sagittal length by at least 3.1 cm AP. There is an associated small loculated posterior right pleural effusion at that level. Moderate posterior right and of atelectasis just inferior to the mass. In the right upper lobe. A 4 mm round noncalcified nodule lateral right upper lobe image 15. A series of 3 tiny subpleural nodular densities together in the lateral right upper lobe on image 16 with the largest nodule in that group measuring 3 mm. A 4 mm x 2 mm noncalcified nodule in the anterior right upper lobe on image 16. A 2 mm noncalcified nodule in the inferior lateral right upper lobe near the fissure. In the right middle lobe there is an irregular cavitary lesion measuring 1.4 cm x 1.2 cm x 1 cm on axial image 29. A 4 mm triangular area of nodular pleural thickening in the inferior lateral right middle lobe adjacent to the fissure on image 34. An irregular area of atelectasis in the medial right middle lobe with 1.5 cm irregular density in that area.  There is also a faint 4 mm nodular density in the anterior right middle lobe on image 36. Mild left posterior basilar atelectasis. No left effusion. A lobulated mass superior segment left lower lobe measuring 1.9 cm x 1.4 cm x 1.4 cm showing a minimal peripheral airspace cavitation. This is centered on image 29. A 2 mm noncalcified subpleural nodule in the posterior left upper lobe on image 13. A small irregular fibrotic looking density and faint atelectasis along the anterior margin of the left upper lobe on image 23. Heart size is within normal. Moderate CAD. Minimal calcification of the aortic arch without dilatation. Mild mediastinal adenopathy. A group of lymph nodes anterior to the aortic arch with the largest measuring 8mm. Right paratracheal lymph nodes near the ana with the largest measuring 1.3 cm. Several superior right paratracheal lymph nodes measuring in the 2 and 3 mm size range. A slightly plump right superior hilar lymph node. A plump 1.2 cm left paratracheal lymph node near the AP window. A 1 cm left hilar lymph node. Catia are difficult to evaluate without IV contrast. No hiatal hernia. Esophagus is not dilated or thickened. No axillary adenopathy. Abdomen. Old right nephrostomy catheter. The area of cortical edema in the superior-lateral right kidney shows some retained striated CT contrast from yesterday. There is still severe left hydronephrosis. Only part of the upper abdomen is included. Known cholecystectomy with a chronic dilated common bile duct. No ascites. Bones: No thoracic compression fracture. A mild to moderate thoracic kyphosis. Mild degenerative changes. Mild disc narrowing and spurring at T8/9 and T9/10 and T11/12. No evidence for bony metastatic disease. .     1. Multiple lung masses consistent with metastatic disease. 2. The largest of these is at the posterior right lower lobe measuring 5.7 cm with irregular central cavitation. 3. The largest left lung mass is 1.9 cm in the superior segment left lower lobe.  4. A tiny along the posterior inferior left kidney. There are 2 small cortical cysts in the inferior left kidney measuring in the 8 mm size range. No evidence for a stone in the dilated left renal pelvis or ureter. No perinephric stranding. The right kidney has a nephrostomy tube, going through the posterior right flank, through the posterior inferior margin of the right kidney, looping in the right renal pelvis, and terminating in the posterior upper pole calyx. No dilatation of the right. The right kidney shows moderate preservation of renal parenchyma. However, there are large areas of abnormal striated nephrogram in the upper pole and posterior mid pole suspicious for active pyelonephritis. Uterus cyst in the anterior inferior right kidney. A 7 mm triangular nonobstructing stone in the anterior mid right collecting system. No other definite stones. A 7 mm cortical cyst in the anterior upper pole margin. The right ureter is not dilated. No perinephric stranding. There is mild infiltration of the skin at the posterior tube insertion site. The gallbladder is absent. The common hepatic duct measures 12 mm. The common bile duct measures 5 mm. The intrahepatic bile ducts are mildly dilated at the susi hepatis. The liver does not show any fatty change or focal masses. There is a mild scar of the liver margin at the gallbladder fossa. No ascites. The pancreas is small to normal in size and the pancreatic duct is not dilated. A 2.7 cm x 2.6 cm x 2 cm duodenal diverticulum posterior to the pancreatic head and adjacent to the distal common bile duct. Normal size and appearance of the spleen and adrenal glands. The aorta is moderately calcified with a moderate stenosis just above the bifurcation. Mild calcific stenosis of the right and left common iliac arteries. Numerous clips along the gated aortic bifurcation and common iliac arteries suggesting lymph node dissection. There is currently no para-aortic adenopathy.  No hiatal Clips in the low right and left lateral pelvis along the obturator regions. Clips at the distal external iliac nickolas chains just above the inguinal ligaments. A few small right inguinal lymph nodes are present. A noninflamed appendix in the right lower quadrant with a small calcification. Moderate stool and gas distending the proximal and transverse colon. Mild amount of stool and gas with minimal distention of the descending and sigmoid colon. Colon obstruction at the rectosigmoid level looks high-grade. There is a moderate amount of stool distending the mid and inferior rectum. There is mild skin thickening of the buttock area overlying the inferior coccyx without obvious ulceration. There is additional mild skin thickening and haziness of the subcutaneous fat inferior medial left buttock without any obvious ulceration. The distal portion of the patient's urinary drainage tube appears caudate up against the peritoneum, going between the legs. Bones: No spine compression fracture. No obvious erosive changes of the sacrum or coccyx. All hips. No evidence for bony metastatic disease. 1. Irregular 4.9 cm cavitary mass in the posterior inferior right lung base. 2. Severe left hydronephrosis and hydroureter and massive distention of the urinary bladder. 3. Large central pelvic mass at the expected location of the vagina, extending down to the inferior vaginal margin. This mass appears to be involving the posterior bladder margin and anterior rectal wall. Extrinsic compression and high-grade obstruction of the bowel at the rectosigmoid junction. 4. Malignant lymphadenopathy adjacent to the rectosigmoid junction and extending along the inferior mesenteric artery. 5. Right nephrostomy catheter, no right hydronephrosis but the right kidney shows abnormal cortical nephrogram suggesting pyelonephritis. 6. A 7 mm nonobstructing mid right renal calculus.  7. Numerous clips along the aortic bifurcation and upper pelvic there is nonocclusive thrombus in the distal thigh, popliteal vein, and occlusive thrombus is noted in the mid and proximal thigh (femoral vein) and in the common femoral vein. Positive for DVT including occlusive intraluminal involvement of the right common femoral vein through the mid thigh, and nonocclusive thrombus is noted more distally on the right. No evidence of left-sided DVT. ALERT:  THIS IS AN ABNORMAL REPORT-positive for right leg DVT extending to the common femoral vein. Note: The patient's nurse was contacted telephonically by myself at the time of this dictation to communicate positive findings after hours. Assessment:    Active Problems:    Acute urinary retention    Pelvic mass    Hydronephrosis    Uncontrolled diabetes mellitus type 2 without complications (HCC)    Acidosis    Acute deep vein thrombosis (DVT) of right lower extremity (HCC)    Moderate protein-calorie malnutrition (HCC)    Palliative care by specialist    Pain, neoplasm-related    Goals of care, counseling/discussion    Nausea  Resolved Problems:    * No resolved hospital problems. *      Plan:  1. Acute hydronephrosis with hydroureter and urinary retention: CT abdomen with left hydronephrosis and urinary dis Has been seen by urology. Was for nephrostomy  And stent exchange but procedure cancelled pending family decision on Hospice. 2.   Endometrial cancer with mets to lung: CT abdomen with 4.9 cm cavitary mass right lung base, large central pelvic mass near vagina. Has been seen by oncology, palliative care. Family to discuss and decide on Hospice t this time. Patient is a full code. 3. Klebsiella oxytoca UTI: has been seen by ID. Was on ceftriaxone and oral keflex. PO meds on hold for now until patient able to take po.   4. Buttock wound: (POA); wound care nurse. Seen by ID. Oral keflex on hold for now due to no oral intake by patient-per ID. Continue santo (placed in ER)  5. Hypokalemia: resolved.    6. RLE DVT: 02/05  BLE US with occlusive thrombus right common femoral vein, nonocclusive thrombus on right. On  Lovenox  7. DM2:  A1c 11.5. Blood sugars stable, continue Lantus 10 units QHS, high dose insulin slide scale. 4 carb diet  8. Hypothyroidism: TSH 0.059, continue levothyroxine. 9.   HTN:  Reasonably controlled on lisinopril, metoprolol,     Disposition: plans were to Dc home on Hospice. Family changed their minds after patient given narcan for lethargy.      Electronically signed by FANI Shaver CNP on 2/10/2020 at 12:30 PM

## 2020-02-10 NOTE — PROGRESS NOTES
[address] [place] [address] [facility]  [place]  Progress Note    SUBJECTIVE:  Chief Complaint   Patient presents with    Wound Check     wound on buttocks area, was living with other family, son brought her up to this area to take care of her now, has a urinary tube in right side, wound on buttocks, swelling to legs     Events noted. Internalization of nephrostomy tube was canceled. The patient apparently was overly sedated and received Narcan and now is confused and agitated. Son is very upset about this. Asking advice and suggestions. Review of systems:  As stated above in the chief complaint, otherwise negative.     Medications:  Scheduled Meds:   metoprolol tartrate  25 mg Oral BID    cephALEXin  500 mg Oral 3 times per day    nystatin   Topical BID    insulin lispro  0-18 Units Subcutaneous TID WC    sennosides-docusate sodium  2 tablet Oral BID    Petrolatum   Topical BID    heparin flush  5 mL Intracatheter BID    sodium chloride flush  10 mL Intravenous 2 times per day    insulin glargine  10 Units Subcutaneous Nightly    fenofibrate  160 mg Oral Daily    levothyroxine  50 mcg Oral Daily    lisinopril  2.5 mg Oral Daily    lithium  300 mg Oral BID WC    acyclovir  400 mg Oral BID    enoxaparin  1 mg/kg Subcutaneous BID     Continuous Infusions:   sodium chloride 100 mL/hr at 02/10/20 1202    sodium chloride 12.5 mL/hr at 20 0606    dextrose       PRN Meds:haloperidol lactate, metoprolol, acetaminophen, metoclopramide, [DISCONTINUED] morphine **OR** morphine, heparin flush, morphine, glucose, dextrose, glucagon (rDNA), dextrose, sodium chloride flush, magnesium hydroxide, ondansetron    OBJECTIVE:  /60   Pulse 141   Temp 99.3 °F (37.4 °C) (Axillary)   Resp 18   Ht 5' 2\" (1.575 m)   Wt 136 lb 3.2 oz (61.8 kg)   SpO2 92%   BMI 24.91 kg/m²   Temp  Av.4 °F (36.9 °C)  Min: 97.8 °F (36.6 °C)  Max: 99.3 °F (37.4 °C)  Constitutional: The patient is awake, alert, asymptomatic  · Bilateral buttock wound  · DVT  · Low-grade fever associated to DVT    PLAN:  · Patient does not seem to be accepting oral medications. We will put antibiotics on hold. If she is to have a urological procedure we could give her Ceftriaxone 1 day before the procedure  · Continue Acyclovir prophylaxis    I spoke with son at length. He was asking if a feeding tube would be appropriate. I told him that this would diminish her quality of life in spite of prolonging it states she would not be able to enjoy the taste of food over drinks or water. He does understand that she has a terminal disease. He is struggling on making a decision about hospice. I told him that there was no need to make an urgent decision and that he should ponder about it. His wife just had her father die in hospice after 9 months and is weighing in on that decision.     Connie Duffy  12:04 PM  2/10/2020

## 2020-02-10 NOTE — PROGRESS NOTES
[facility]   Progress Note    Admitting Date and Time: 2/5/2020  1:32 PM  Admit Dx: Acute urinary retention [R33.8]  Acute urinary retention [R33.8]    Subjective: Patient was admitted on fifth as well as not feeling well, known history of hypertension, uterine CA, has port in the right upper chest, known diabetes as well as bipolar.  Patient has a right nephrostomy tube.  Initial impression of pelvic mass, possible prior surgery for uterine cancer, hydronephrosis with hydroureter, uncontrolled diabetes, and suspected DVT.  Seen by oncology, patient with prior diagnosis of stage IIIb endometrial serous adenocarcinoma in 2016. Raúl Beck has undergone total abdominal hysterectomy with bilateral salpingo-oophorectomy, also pelvic/aortic lymphadenectomy.  Patient also has right lower lung mass consistent with adenocarcinoma, p53 positive, CD10 positive, PAX 8+.  Systemic palliative chemo was recommended and will be started after discharge from the hospital.  Repeat CT as well as baseline CA-125 have been ordered by oncology. Raúl Beck also seen from urology, patient does have obstructed left kidney, however the nephrostomy tube has decompressed her right kidney. Urology had planned for internalization of stent this week. This was canceled as will not have effect on the pain relief. Seen by podiatry on 8 for malnutrition. Seen by infectious disease on ninth, Zosyn was changed to cephalexin. Hospice referral was made during the week, as of now family is talking to the hospice people. Patient was admitted with Acute urinary retention [R33.8]  Acute urinary retention [R33.8]. Patient initially was laying unresponsive. Per RN: Received call from nursing that son wanted to speak to me. Came and had lengthy discussion with the son as well as daughter-in-law.   Family was not sure of going to hospice care, son was very upset as patient looking different not able to eat what she was able to lymphadenopathy   Pulmonary/Chest: clear to auscultation bilaterally- no wheezes, rales or rhonchi, normal air movement, no respiratory distress  Cardiovascular: normal rate, normal S1 and S2, no gallops, intact distal pulses and no carotid bruits  Abdomen: soft, non-tender, non-distended, normal bowel sounds, no masses or organomegaly      Recent Labs     02/07/20  1713 02/09/20  0810 02/10/20  0420    143 138   K 3.4* 3.5 4.1    109* 103   CO2 19* 26 26   BUN 4* 8 13   CREATININE 0.6 0.9 1.0   GLUCOSE 214* 202* 167*   CALCIUM 9.0 8.8 8.8       Recent Labs     02/07/20  1713 02/09/20  0810 02/10/20  0420   WBC 9.2 6.5 8.2   RBC 3.70 3.37* 3.39*   HGB 9.1* 8.3* 8.3*   HCT 31.8* 29.3* 29.6*   MCV 85.9 86.9 87.3   MCH 24.6* 24.6* 24.5*   MCHC 28.6* 28.3* 28.0*   RDW 16.3* 16.5* 16.8*    340 383   MPV 10.9 10.0 10.0     Hemoglobin 8.3    Radiology:   XR CHEST PORTABLE   Final Result      NO ACUTE CARDIOPULMONARY PROCESS         CT Chest WO Contrast   Final Result      1. Multiple lung masses consistent with metastatic disease. 2. The largest of these is at the posterior right lower lobe measuring   5.7 cm with irregular central cavitation. 3. The largest left lung mass is 1.9 cm in the superior segment left   lower lobe. 4. A tiny right pleural effusion. 5. Thyroid nodules. Largest is on the left. 6. Mild mediastinal adenopathy. US DUP LOWER EXTREMITIES BILATERAL VENOUS   Final Result   Positive for DVT including occlusive intraluminal involvement of the   right common femoral vein through the mid thigh, and nonocclusive   thrombus is noted more distally on the right. No evidence of left-sided DVT. ALERT:  THIS IS AN ABNORMAL REPORT-positive for right leg DVT   extending to the common femoral vein. Note: The patient's nurse was contacted telephonically by myself at   the time of this dictation to communicate positive findings after   hours.          XR

## 2020-02-10 NOTE — PLAN OF CARE
Problem: Risk for Impaired Skin Integrity  Goal: Tissue integrity - skin and mucous membranes  Description  Structural intactness and normal physiological function of skin and  mucous membranes. 2/10/2020 0538 by Lanny Musa RN  Outcome: Met This Shift  2/10/2020 0537 by Lanny Musa RN  Outcome: Met This Shift     Problem: Falls - Risk of:  Goal: Will remain free from falls  Description  Will remain free from falls  2/10/2020 0538 by Sindy Musa RN  Outcome: Met This Shift  2/10/2020 0537 by Lanny Musa RN  Outcome: Met This Shift     Problem: Skin Integrity:  Goal: Absence of new skin breakdown  Description  Absence of new skin breakdown  2/10/2020 0538 by Sindy Musa RN  Outcome: Met This Shift  2/10/2020 0537 by Lanny Musa RN  Outcome: Met This Shift

## 2020-02-10 NOTE — PROGRESS NOTES
Hospice consult called, demographics reviewed. Change in patient status, including oxygen requirements, pain not managed by IV morphine, and change in mental status. Family requesting information on code status and how to best keep patient comfortable, as well as necessity of IR procedure planned for tomorrow. Hospice nurse to see patient tonight.

## 2020-02-11 LAB
ABO/RH: NORMAL
ANION GAP SERPL CALCULATED.3IONS-SCNC: 12 MMOL/L (ref 7–16)
ANTIBODY SCREEN: NORMAL
BLOOD BANK DISPENSE STATUS: NORMAL
BLOOD BANK PRODUCT CODE: NORMAL
BPU ID: NORMAL
BUN BLDV-MCNC: 13 MG/DL (ref 8–23)
CALCIUM SERPL-MCNC: 8.5 MG/DL (ref 8.6–10.2)
CHLORIDE BLD-SCNC: 111 MMOL/L (ref 98–107)
CO2: 26 MMOL/L (ref 22–29)
CREAT SERPL-MCNC: 0.8 MG/DL (ref 0.5–1)
DESCRIPTION BLOOD BANK: NORMAL
GFR AFRICAN AMERICAN: >60
GFR NON-AFRICAN AMERICAN: >60 ML/MIN/1.73
GLUCOSE BLD-MCNC: 295 MG/DL (ref 74–99)
HCT VFR BLD CALC: 25.1 % (ref 34–48)
HCT VFR BLD CALC: 27.1 % (ref 34–48)
HEMOGLOBIN: 7 G/DL (ref 11.5–15.5)
HEMOGLOBIN: 7.4 G/DL (ref 11.5–15.5)
MCH RBC QN AUTO: 23.9 PG (ref 26–35)
MCH RBC QN AUTO: 24.3 PG (ref 26–35)
MCHC RBC AUTO-ENTMCNC: 27.3 % (ref 32–34.5)
MCHC RBC AUTO-ENTMCNC: 27.9 % (ref 32–34.5)
MCV RBC AUTO: 87.2 FL (ref 80–99.9)
MCV RBC AUTO: 87.7 FL (ref 80–99.9)
METER GLUCOSE: 221 MG/DL (ref 74–99)
METER GLUCOSE: 242 MG/DL (ref 74–99)
METER GLUCOSE: 284 MG/DL (ref 74–99)
METER GLUCOSE: 292 MG/DL (ref 74–99)
PDW BLD-RTO: 16.6 FL (ref 11.5–15)
PDW BLD-RTO: 16.9 FL (ref 11.5–15)
PLATELET # BLD: 251 E9/L (ref 130–450)
PLATELET # BLD: 313 E9/L (ref 130–450)
PMV BLD AUTO: 10.4 FL (ref 7–12)
PMV BLD AUTO: 9.2 FL (ref 7–12)
POTASSIUM SERPL-SCNC: 3.2 MMOL/L (ref 3.5–5)
RBC # BLD: 2.88 E12/L (ref 3.5–5.5)
RBC # BLD: 3.09 E12/L (ref 3.5–5.5)
SODIUM BLD-SCNC: 149 MMOL/L (ref 132–146)
WBC # BLD: 7 E9/L (ref 4.5–11.5)
WBC # BLD: 7.7 E9/L (ref 4.5–11.5)

## 2020-02-11 PROCEDURE — 97535 SELF CARE MNGMENT TRAINING: CPT

## 2020-02-11 PROCEDURE — 86900 BLOOD TYPING SEROLOGIC ABO: CPT

## 2020-02-11 PROCEDURE — 82962 GLUCOSE BLOOD TEST: CPT

## 2020-02-11 PROCEDURE — APPSS30 APP SPLIT SHARED TIME 16-30 MINUTES: Performed by: NURSE PRACTITIONER

## 2020-02-11 PROCEDURE — 2580000003 HC RX 258: Performed by: INTERNAL MEDICINE

## 2020-02-11 PROCEDURE — 36430 TRANSFUSION BLD/BLD COMPNT: CPT

## 2020-02-11 PROCEDURE — 6360000002 HC RX W HCPCS: Performed by: NURSE PRACTITIONER

## 2020-02-11 PROCEDURE — 86923 COMPATIBILITY TEST ELECTRIC: CPT

## 2020-02-11 PROCEDURE — 6370000000 HC RX 637 (ALT 250 FOR IP): Performed by: PHYSICIAN ASSISTANT

## 2020-02-11 PROCEDURE — P9016 RBC LEUKOCYTES REDUCED: HCPCS

## 2020-02-11 PROCEDURE — 97161 PT EVAL LOW COMPLEX 20 MIN: CPT

## 2020-02-11 PROCEDURE — 99233 SBSQ HOSP IP/OBS HIGH 50: CPT | Performed by: INTERNAL MEDICINE

## 2020-02-11 PROCEDURE — 86850 RBC ANTIBODY SCREEN: CPT

## 2020-02-11 PROCEDURE — 36415 COLL VENOUS BLD VENIPUNCTURE: CPT

## 2020-02-11 PROCEDURE — 6370000000 HC RX 637 (ALT 250 FOR IP): Performed by: INTERNAL MEDICINE

## 2020-02-11 PROCEDURE — 2060000000 HC ICU INTERMEDIATE R&B

## 2020-02-11 PROCEDURE — 80048 BASIC METABOLIC PNL TOTAL CA: CPT

## 2020-02-11 PROCEDURE — 36591 DRAW BLOOD OFF VENOUS DEVICE: CPT

## 2020-02-11 PROCEDURE — 6370000000 HC RX 637 (ALT 250 FOR IP): Performed by: NURSE PRACTITIONER

## 2020-02-11 PROCEDURE — 97530 THERAPEUTIC ACTIVITIES: CPT

## 2020-02-11 PROCEDURE — 2700000000 HC OXYGEN THERAPY PER DAY

## 2020-02-11 PROCEDURE — 85027 COMPLETE CBC AUTOMATED: CPT

## 2020-02-11 PROCEDURE — 86901 BLOOD TYPING SEROLOGIC RH(D): CPT

## 2020-02-11 RX ORDER — POTASSIUM BICARBONATE 25 MEQ/1
50 TABLET, EFFERVESCENT ORAL 2 TIMES DAILY
Status: DISCONTINUED | OUTPATIENT
Start: 2020-02-11 | End: 2020-02-11 | Stop reason: CLARIF

## 2020-02-11 RX ORDER — 0.9 % SODIUM CHLORIDE 0.9 %
20 INTRAVENOUS SOLUTION INTRAVENOUS ONCE
Status: COMPLETED | OUTPATIENT
Start: 2020-02-11 | End: 2020-02-12

## 2020-02-11 RX ORDER — SENNA AND DOCUSATE SODIUM 50; 8.6 MG/1; MG/1
2 TABLET, FILM COATED ORAL 2 TIMES DAILY
DISCHARGE
Start: 2020-02-11 | End: 2020-02-12

## 2020-02-11 RX ORDER — NYSTATIN 100000 U/G
OINTMENT TOPICAL
DISCHARGE
Start: 2020-02-11 | End: 2020-02-12

## 2020-02-11 RX ADMIN — Medication 10 ML: at 21:49

## 2020-02-11 RX ADMIN — SENNOSIDES AND DOCUSATE SODIUM 2 TABLET: 8.6; 5 TABLET ORAL at 08:04

## 2020-02-11 RX ADMIN — INSULIN LISPRO 2 UNITS: 100 INJECTION, SOLUTION INTRAVENOUS; SUBCUTANEOUS at 17:20

## 2020-02-11 RX ADMIN — POTASSIUM BICARBONATE 40 MEQ: 782 TABLET, EFFERVESCENT ORAL at 10:18

## 2020-02-11 RX ADMIN — FENOFIBRATE 160 MG: 160 TABLET ORAL at 08:03

## 2020-02-11 RX ADMIN — ACYCLOVIR 400 MG: 200 CAPSULE ORAL at 08:03

## 2020-02-11 RX ADMIN — NYSTATIN OINTMENT: 100000 OINTMENT TOPICAL at 08:04

## 2020-02-11 RX ADMIN — ENOXAPARIN SODIUM 60 MG: 60 INJECTION SUBCUTANEOUS at 22:14

## 2020-02-11 RX ADMIN — INSULIN GLARGINE 10 UNITS: 100 INJECTION, SOLUTION SUBCUTANEOUS at 21:41

## 2020-02-11 RX ADMIN — METOPROLOL TARTRATE 25 MG: 25 TABLET ORAL at 08:03

## 2020-02-11 RX ADMIN — LISINOPRIL 2.5 MG: 2.5 TABLET ORAL at 08:03

## 2020-02-11 RX ADMIN — LITHIUM CARBONATE 300 MG: 300 CAPSULE, GELATIN COATED ORAL at 21:51

## 2020-02-11 RX ADMIN — METOPROLOL TARTRATE 25 MG: 25 TABLET ORAL at 21:51

## 2020-02-11 RX ADMIN — ENOXAPARIN SODIUM 60 MG: 60 INJECTION SUBCUTANEOUS at 10:18

## 2020-02-11 RX ADMIN — PETROLATUM: 420 OINTMENT TOPICAL at 22:05

## 2020-02-11 RX ADMIN — NYSTATIN OINTMENT: 100000 OINTMENT TOPICAL at 22:05

## 2020-02-11 RX ADMIN — SODIUM CHLORIDE: 4.5 INJECTION, SOLUTION INTRAVENOUS at 18:46

## 2020-02-11 RX ADMIN — SODIUM CHLORIDE: 4.5 INJECTION, SOLUTION INTRAVENOUS at 09:06

## 2020-02-11 RX ADMIN — INSULIN LISPRO 3 UNITS: 100 INJECTION, SOLUTION INTRAVENOUS; SUBCUTANEOUS at 11:36

## 2020-02-11 RX ADMIN — SODIUM CHLORIDE 500 ML: 9 INJECTION, SOLUTION INTRAVENOUS at 21:41

## 2020-02-11 RX ADMIN — ACYCLOVIR 400 MG: 200 CAPSULE ORAL at 21:51

## 2020-02-11 RX ADMIN — INSULIN LISPRO 3 UNITS: 100 INJECTION, SOLUTION INTRAVENOUS; SUBCUTANEOUS at 07:07

## 2020-02-11 RX ADMIN — PETROLATUM: 420 OINTMENT TOPICAL at 08:03

## 2020-02-11 ASSESSMENT — PAIN SCALES - GENERAL
PAINLEVEL_OUTOF10: 0
PAINLEVEL_OUTOF10: 0

## 2020-02-11 NOTE — DISCHARGE SUMMARY
[facility] orthopedics called to ask for a script to be faxed to them at [phone]. She had an appt. W/ ortho today for both knees. Bilateral knees.    [facility] EMERGENCY SERVICE Physician Discharge Summary       [facility]  [phone]          Activity level: As tolerated    Diet: Dietary Nutrition Supplements: Diabetic Oral Supplement  DIET CARB CONTROL; Carb Control: 5 carb choices (75 gms)/meal    Labs:    Dispo: Home    Condition at discharge: Guarded    Continue supplemental oxygen via nasal canula @ 2 LPM round-the-clock. Continue CPAP / BiPAP during sleep as prior to admission. Patient ID:  Ochoa [facility]  [id number]  62 y.o.  1950    Admit date: 2/5/2020    Discharge date and time:  2/11/2020  2:24 PM    Admission Diagnoses: Active Problems:    Acute urinary retention    Pelvic mass    Hydronephrosis    Uncontrolled diabetes mellitus type 2 without complications (HCC)    Acidosis    Acute deep vein thrombosis (DVT) of right lower extremity (HCC)    Moderate protein-calorie malnutrition (HCC)    Palliative care by specialist    Pain, neoplasm-related    Goals of care, counseling/discussion    Nausea  Resolved Problems:    * No resolved hospital problems. *      Discharge Diagnoses: Active Problems:    Acute urinary retention    Pelvic mass    Hydronephrosis    Uncontrolled diabetes mellitus type 2 without complications (HCC)    Acidosis    Acute deep vein thrombosis (DVT) of right lower extremity (HCC)    Moderate protein-calorie malnutrition (HCC)    Palliative care by specialist    Pain, neoplasm-related    Goals of care, counseling/discussion    Nausea  Resolved Problems:    * No resolved hospital problems.  *      Consults:  IP CONSULT TO UROLOGY  IP CONSULT TO UROLOGY  IP CONSULT TO HEM/ONC  IP CONSULT TO SOCIAL WORK  IP CONSULT TO PALLIATIVE CARE  IP CONSULT TO IV TEAM  IP CONSULT TO IV TEAM  IP CONSULT TO INFECTIOUS DISEASES  IP CONSULT TO HOSPICE    Procedures: None    Hospital Course: Patient was admitted with Acute urinary retention [R33.8]  Acute urinary 167/82  (!) 168/74   Pulse: 97 102  104   Resp: 18 20 20   Temp: 99.2 °F (37.3 °C) 97.9 °F (36.6 °C)  98.6 °F (37 °C)   TempSrc: Oral Axillary  Axillary   SpO2: 95% 95%  91%   Weight:   140 lb 3.2 oz (63.6 kg)    Height:           General Appearance: alert and oriented to person, place and time, well-developed and well-nourished, in no acute distress  Skin: warm and dry, no rash or erythema  Head: normocephalic and atraumatic  Eyes: pupils equal, round, and reactive to light, extraocular eye movements intact, conjunctivae normal  ENT: tympanic membrane, external ear and ear canal normal bilaterally, oropharynx clear and moist with normal mucous membranes  Neck: neck supple and non tender without mass, no thyromegaly or thyroid nodules, no cervical lymphadenopathy   Pulmonary/Chest: clear to auscultation bilaterally- no wheezes, rales or rhonchi, normal air movement, no respiratory distress  Cardiovascular: normal rate, normal S1 and S2, no gallops, intact distal pulses and no carotid bruits  Abdomen: soft, non-tender, non-distended, normal bowel sounds, no masses or organomegaly  I/O last 3 completed shifts: In: 6474 [P.O.:320; I.V.:1321]  Out: 3950 [Urine:3950]  I/O this shift: In: 500 [P.O.:500]  Out: 1150 [Urine:1150]      LABS:  Recent Labs     02/09/20  0810 02/10/20  0420 02/11/20  0340    138 149*   K 3.5 4.1 3.2*   * 103 111*   CO2 26 26 26   BUN 8 13 13   CREATININE 0.9 1.0 0.8   GLUCOSE 202* 167* 295*   CALCIUM 8.8 8.8 8.5*       Recent Labs     02/10/20  0420 02/11/20  0340 02/11/20  1330   WBC 8.2 7.0 7.7   RBC 3.39* 3.09* 2.88*   HGB 8.3* 7.4* 7.0*   HCT 29.6* 27.1* 25.1*   MCV 87.3 87.7 87.2   MCH 24.5* 23.9* 24.3*   MCHC 28.0* 27.3* 27.9*   RDW 16.8* 16.9* 16.6*    313 251   MPV 10.0 10.4 9.2         Imaging:   XR CHEST PORTABLE   Final Result      NO ACUTE CARDIOPULMONARY PROCESS         CT Chest WO Contrast   Final Result      1.  Multiple lung masses consistent with metastatic disease. 2. The largest of these is at the posterior right lower lobe measuring   5.7 cm with irregular central cavitation. 3. The largest left lung mass is 1.9 cm in the superior segment left   lower lobe. 4. A tiny right pleural effusion. 5. Thyroid nodules. Largest is on the left. 6. Mild mediastinal adenopathy. US DUP LOWER EXTREMITIES BILATERAL VENOUS   Final Result   Positive for DVT including occlusive intraluminal involvement of the   right common femoral vein through the mid thigh, and nonocclusive   thrombus is noted more distally on the right. No evidence of left-sided DVT. ALERT:  THIS IS AN ABNORMAL REPORT-positive for right leg DVT   extending to the common femoral vein. Note: The patient's nurse was contacted telephonically by myself at   the time of this dictation to communicate positive findings after   hours. XR PELVIS (1-2 VIEWS)   Final Result      No evidence of acute fracture. CT ABDOMEN PELVIS W IV CONTRAST Additional Contrast? None   Final Result   Addendum 1 of 1   Addendum: Complex findings discussed with Dr. Alix Hanks in the ER at   1720 hours 2/5/2020. Final      XR CHEST PORTABLE   Final Result      1. Atelectasis and/or infiltrate at the right lower lobe. 2. Questionable lung nodule abutting the left hilum. Patient Instructions:      Medication List      START taking these medications    metoprolol tartrate 25 MG tablet  Commonly known as:  LOPRESSOR  Take 1 tablet by mouth 2 times daily     nystatin 315670 UNIT/GM ointment  Commonly known as:  MYCOSTATIN  Apply topically 2 times daily.      sennosides-docusate sodium 8.6-50 MG tablet  Commonly known as:  SENOKOT-S  Take 2 tablets by mouth 2 times daily        CONTINUE taking these medications    acyclovir 400 MG tablet  Commonly known as:  ZOVIRAX     alogliptin 25 MG Tabs tablet  Commonly known as:  NESINA     fenofibrate 160 MG tablet     levothyroxine 50 MCG tablet  Commonly known as:  SYNTHROID     lisinopril 2.5 MG tablet  Commonly known as:  PRINIVIL;ZESTRIL     lithium 300 MG capsule     metFORMIN 500 MG extended release tablet  Commonly known as:  GLUCOPHAGE-XR     STARLIX PO           Where to Get Your Medications      Information about where to get these medications is not yet available    Ask your nurse or doctor about these medications  · metoprolol tartrate 25 MG tablet  · nystatin 624959 UNIT/GM ointment  · sennosides-docusate sodium 8.6-50 MG tablet           Note that more than 30 minutes was spent in preparing discharge papers, discussing discharge with patient, medication review, etc.    Signed:  Electronically signed by Leticia Esposito MD on 2/11/2020 at 2:24 PM    NOTE: This report was transcribed using voice recognition software. Every effort was made to ensure accuracy; however, inadvertent computerized transcription errors may be present.

## 2020-02-11 NOTE — PROGRESS NOTES
[address] [place] [address] [facility]  PRASHANT  Progress Note    SUBJECTIVE:  Chief Complaint   Patient presents with    Wound Check     wound on buttocks area, was living with other family, son brought her up to this area to take care of her now, has a urinary tube in right side, wound on buttocks, swelling to legs     The patient is less confused today. Daughter-in-law says that they are going to be taking her home and start palliative chemotherapy to see how she tolerates it. Otherwise no other complaints. Review of systems:  As stated above in the chief complaint, otherwise negative. Medications:  Scheduled Meds:   metoprolol tartrate  25 mg Oral BID    insulin lispro  0-6 Units Subcutaneous TID WC    nystatin   Topical BID    sennosides-docusate sodium  2 tablet Oral BID    Petrolatum   Topical BID    heparin flush  5 mL Intracatheter BID    sodium chloride flush  10 mL Intravenous 2 times per day    insulin glargine  10 Units Subcutaneous Nightly    fenofibrate  160 mg Oral Daily    levothyroxine  50 mcg Oral Daily    lisinopril  2.5 mg Oral Daily    lithium  300 mg Oral BID WC    acyclovir  400 mg Oral BID    enoxaparin  1 mg/kg Subcutaneous BID     Continuous Infusions:   sodium chloride 100 mL/hr at 20 0906    sodium chloride 12.5 mL/hr at 20 0606    dextrose       PRN Meds:haloperidol lactate, metoprolol, acetaminophen, metoclopramide, [DISCONTINUED] morphine **OR** morphine, heparin flush, morphine, glucose, dextrose, glucagon (rDNA), dextrose, sodium chloride flush, magnesium hydroxide, ondansetron    OBJECTIVE:  BP (!) 168/74   Pulse 104   Temp 98.6 °F (37 °C) (Axillary)   Resp 20   Ht 5' 2\" (1.575 m)   Wt 140 lb 3.2 oz (63.6 kg)   SpO2 91%   BMI 25.64 kg/m²   Temp  Av.7 °F (37.1 °C)  Min: 97.9 °F (36.6 °C)  Max: 99.3 °F (37.4 °C)  Constitutional: The patient is lying in bed. She is awake, alert and somewhat confused but cooperative.   Skin: No rash.  HEENT: No jaundice. No thrush. Neck: Supple to movements. Chest: No respiratory distress. Good breath sounds. Cardiovascular: Heart sounds muffled. Abdomen: Nondistended. Soft but patient does not allow adequate examination  Right nephrostomy tube with clear urine. Extremities: Minimal edema. Lines: Right Mediport accessed    Laboratory and Tests Review:  Lab Results   Component Value Date    WBC 7.0 02/11/2020    WBC 8.2 02/10/2020    WBC 6.5 02/09/2020    HGB 7.4 (L) 02/11/2020    HCT 27.1 (L) 02/11/2020    MCV 87.7 02/11/2020     02/11/2020     Lab Results   Component Value Date    NEUTROABS 8.76 (H) 02/05/2020     No results found for: CRPHS  Lab Results   Component Value Date    ALT 6 02/05/2020    AST 12 02/05/2020    ALKPHOS 110 (H) 02/05/2020    BILITOT 0.2 02/05/2020     Lab Results   Component Value Date     02/11/2020    K 3.2 02/11/2020    K 3.4 02/06/2020     02/11/2020    CO2 26 02/11/2020    BUN 13 02/11/2020    CREATININE 0.8 02/11/2020    CREATININE 1.0 02/10/2020    CREATININE 0.9 02/09/2020    GFRAA >60 02/11/2020    LABGLOM >60 02/11/2020    GLUCOSE 295 02/11/2020    PROT 6.9 02/05/2020    LABALBU 2.6 02/06/2020    CALCIUM 8.5 02/11/2020    BILITOT 0.2 02/05/2020    ALKPHOS 110 02/05/2020    AST 12 02/05/2020    ALT 6 02/05/2020     Lab Results   Component Value Date    CRP 3.9 (H) 02/10/2020     Lab Results   Component Value Date    SEDRATE 59 (H) 02/10/2020     Radiology:      Microbiology:   Urine culture 2/5/2020 >100K Klebsiella oxytoca  Blood cultures 2/5/2020: Negative so far  Blood cultures 2/8/2020: Negative so far    ASSESSMENT:  · Metastatic endometrial carcinoma  · Right-sided hydronephrosis.   Patient has a nephrostomy tube  · Klebsiella bacteriuria, asymptomatic  · Bilateral buttock wound  · DVT  · Low-grade fever associated to DVT    PLAN:  · No need for antibiotics for asymptomatic bacteriuria  · Continue Acyclovir prophylaxis  · The patient can

## 2020-02-11 NOTE — PROGRESS NOTES
2/11/2020 4:58 PM  Service: Urology  Group: [facility] (Shon/Jose Elias/Haim)    James Good  [id number]    Subjective:    Resting comfortably  Arouses to verbal stimuli  Her daughter-in-law is at the bedside  There planning on palliative chemo and no longer pursuing hospice  They are unsure if they would like to proceed with possible IR procedure at this time  She is tolerating the Santo catheter in the right PNT at this time    Review of Systems  Constitutional: No fever or chills, tired   Respiratory: negative for cough and hemoptysis  Cardiovascular: negative for chest pain and dyspnea  Gastrointestinal: negative for abdominal pain, diarrhea, nausea and vomiting   : See above  Derm: negative for rash and skin lesion(s)  Neurological: negative for seizures and tremors  Musculoskeletal: Negative    Psychiatric: Negative   All other reviews are negative      Scheduled Meds:   metoprolol tartrate  25 mg Oral BID    insulin lispro  0-6 Units Subcutaneous TID WC    nystatin   Topical BID    sennosides-docusate sodium  2 tablet Oral BID    Petrolatum   Topical BID    heparin flush  5 mL Intracatheter BID    sodium chloride flush  10 mL Intravenous 2 times per day    insulin glargine  10 Units Subcutaneous Nightly    fenofibrate  160 mg Oral Daily    levothyroxine  50 mcg Oral Daily    lisinopril  2.5 mg Oral Daily    lithium  300 mg Oral BID WC    acyclovir  400 mg Oral BID    enoxaparin  1 mg/kg Subcutaneous BID       Objective:  Vitals:    02/11/20 1554   BP: (!) 155/67   Pulse: 93   Resp: 20   Temp: 97.4 °F (36.3 °C)   SpO2:          Allergies: Codeine and Sulfa antibiotics    General Appearance: sleeping, confused, lethargic   Skin: no rash or erythema  Head: normocephalic and atraumatic  Pulmonary/Chest: normal air movement, no respiratory distress  Abdomen: soft, non-tender, non-distended  Genitourinary: right pNT draining clear yellow urine, santo draining clear yellow urine   Extremities: no cyanosis, clubbing or edema         Labs:     Recent Labs     02/11/20  0340   *   K 3.2*   *   CO2 26   BUN 13   CREATININE 0.8   GLUCOSE 295*   CALCIUM 8.5*       Lab Results   Component Value Date    HGB 7.0 02/11/2020    HCT 25.1 02/11/2020         Assessment/Plan:  Right renal calculus  Bilateral renal cysts  Bilateral hydronephrosis  Urinary retention  UTI (Klebsiella oxytoca)    Continue Saleh catheter  She will need to be discharged home with this  This will need to be changed every 4 weeks  Previously scheduled IR procedure was canceled due to patient's family requesting hospice at the time and not wishing to proceed with procedure. Spoke with the patient's daughter-in-law who states that they are no longer pursuing hospice and pursuing palliative chemotherapy instead. She is unsure of her , the patient's son, would like to proceed with IR procedure at this time now that they are not pursuing hospice. She is going to talk with him and they will notify nursing if they wish to have IR procedure done. This procedure does not necessarily need to be done during admission and could be scheduled as an outpatient if she wishes to have that done. For right now continue the right PNT  We will continue to follow pending decision for IR procedure. UTI was treated with IV antibiotics that have recently been dc'd.        FANI Higgins - CNP   [facility]  Urology      Agree with above  Seen and examined  Agree with the plan and treatment  Daughter in law is present  Awaiting their decision  Son is POA  He is on the road  All options were discussed      [employer], DO

## 2020-02-11 NOTE — PROGRESS NOTES
Physical Therapy    Facility/Department: [address] INTERMEDIATE 1  Initial Assessment    NAME: Mary Carmen Saucedo  : 1950  MRN: [id number]    Date of Service: 2020      Patient Diagnosis(es): The primary encounter diagnosis was Pelvic mass. Diagnoses of Skin ulcer of sacrum, unspecified ulcer stage ([address] Urinary retention, Acute deep vein thrombosis (DVT) of distal vein of right lower extremity ([address] General weakness, Hyperglycemia, and Pain, neoplasm-related were also pertinent to this visit. has a past medical history of Cancer ([address] Diabetes mellitus ([address] and Thyroid disease. has a past surgical history that includes Hysterectomy. 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 Daughter reports patient was Independent, active and driving till 2019 - has been declining since. Daughter plans to have patient return to her home (1 story with 4 steps/2 rails to enter)     Initial Evaluation  Date:  Treatment      Short Term/ Long Term   Goals   Was pt agreeable to Eval/treatment? yes     Does pt have pain?  No c/o pain     Bed Mobility  Rolling: mod assist  Supine to sit: mod assist  Sit to supine: mod assist  Scooting: max assist  Min assist   Transfers Attempted pt declined  Min assist   Ambulation    NT  10 feet with ww with min assist   Stair Negotiation  Ascended and descended  NT   N/A   LE strength     Grossly 3/5   3+/5   balance      Sitting balance fair  Sitting balance good   AM-PAC Raw score               10/24         Pt is alert, confused  LE ROM: WFL  Sensation: NT  Edema: none  Endurance: fair-     ASSESSMENT  Pt displays functional ability as noted in the objective portion of this evaluation. Patient education  Pt educated on importance of mobility    Patient response to education:   Pt verbalized understanding Pt demonstrated skill Pt requires further education in this area   no         ASSESSMENT:    Comments:    Pt agreed to PT evaluation, however declined attempt to stand. Pt SB to min assist to sit edge of bed. Pt's/ family goals   1. Pt unable to state    Patient and or family understand(s) diagnosis, prognosis, and plan of care. no    PLAN:    PT care will be provided in accordance with the objectives noted above. Exercises and functional mobility practice will be used as well as appropriate assistive devices or modalities to obtain goals. Patient and family education will also be administered as needed. Frequency of treatments: 2-5x/week x 5 . Time in  0924  Time out  0937        Evaluation Time includes thorough review of current medical information, gathering information on past medical history/social history and prior level of function, completion of standardized testing/informal observation of tasks, assessment of data and education on plan of care and goals.     CPT codes:  [x] Low Complexity PT evaluation 88508  [] Moderate Complexity PT evaluation 07544  [] High Complexity PT evaluation 75997  [] PT Re-evaluation 89672  [] Gait training 78043 minutes  [] Manual therapy 64359 minutes  [] Therapeutic activities 08262 minutes  [] Therapeutic exercises 28652 minutes  [] Neuromuscular reeducation 40991 minutes     Ileana PT [id number]

## 2020-02-11 NOTE — PROGRESS NOTES
Karli [facility]ball Hospitalist   Progress Note    Admitting Date and Time: 2/5/2020  1:32 PM  Admit Dx: Acute urinary retention [R33.8]  Acute urinary retention [R33.8]    Subjective: Patient was admitted on fifth as well as not feeling well, known history of hypertension, uterine CA, has port in the right upper chest, known diabetes as well as bipolar.  Patient has a right nephrostomy tube.  Initial impression of pelvic mass, possible prior surgery for uterine cancer, hydronephrosis with hydroureter, uncontrolled diabetes, and suspected DVT.  Seen by oncology, patient with prior diagnosis of stage IIIb endometrial serous adenocarcinoma in 2016. Radha Edwards has undergone total abdominal hysterectomy with bilateral salpingo-oophorectomy, also pelvic/aortic lymphadenectomy.  Patient also has right lower lung mass consistent with adenocarcinoma, p53 positive, CD10 positive, PAX 8+.  Systemic palliative chemo was recommended and will be started after discharge from the hospital.  Repeat CT as well as baseline CA-125 have been ordered by oncology. Radha Edwards also seen from urology, patient does have obstructed left kidney, however the nephrostomy tube has decompressed her right kidney. Urology had planned for internalization of stent this week. This was canceled as will not have effect on the pain relief. Seen by podiatry on 8 for malnutrition. Seen by infectious disease on ninth, Zosyn was changed to cephalexin. Hospice referral was made during the week, as of now family is talking to the hospice people. Did speak to son, they  decided not to go  with hospice. Per ID antibiotics not needed. Patient was admitted with Acute urinary retention [R33.8]  Acute urinary retention [R33.8]. Patient initially was laying unresponsive. Per RN: Received call from nursing that son wanted to speak to me. Came and had lengthy discussion with the son as well as daughter-in-law.   Family was not sure of going to hospice care, son was very upset as patient looking different not able to eat what she was able to eat before. Family with very limited understanding about hospice care. Reviewed the chart, patient received around 6:20 AM 1 mg of Dilaudid. After talking to family Narcan was given, patient awake, moaning as before. For now will need sitter also. ROS: denies fever, chills, cp, sob, n/v, HA unless stated above.      metoprolol tartrate  25 mg Oral BID    insulin lispro  0-6 Units Subcutaneous TID WC    nystatin   Topical BID    sennosides-docusate sodium  2 tablet Oral BID    Petrolatum   Topical BID    heparin flush  5 mL Intracatheter BID    sodium chloride flush  10 mL Intravenous 2 times per day    insulin glargine  10 Units Subcutaneous Nightly    fenofibrate  160 mg Oral Daily    levothyroxine  50 mcg Oral Daily    lisinopril  2.5 mg Oral Daily    lithium  300 mg Oral BID WC    acyclovir  400 mg Oral BID    enoxaparin  1 mg/kg Subcutaneous BID     haloperidol lactate, 1 mg, Q6H PRN  metoprolol, 5 mg, Q6H PRN  acetaminophen, 650 mg, Q4H PRN  metoclopramide, 10 mg, Q6H PRN  morphine, 15 mg, Q4H PRN  heparin flush, 5 mL, PRN  morphine, 4 mg, Q4H PRN  glucose, 15 g, PRN  dextrose, 12.5 g, PRN  glucagon (rDNA), 1 mg, PRN  dextrose, 100 mL/hr, PRN  sodium chloride flush, 10 mL, PRN  magnesium hydroxide, 30 mL, Daily PRN  ondansetron, 4 mg, Q6H PRN         Objective:    BP (!) 168/74   Pulse 104   Temp 98.6 °F (37 °C) (Axillary)   Resp 20   Ht 5' 2\" (1.575 m)   Wt 140 lb 3.2 oz (63.6 kg)   SpO2 91%   BMI 25.64 kg/m²   General Appearance: alert and oriented to person, place and time, well-developed and well-nourished, in no acute distress  Skin: warm and dry, no rash or erythema  Head: normocephalic and atraumatic  Eyes: pupils equal, round, and reactive to light, extraocular eye movements intact, conjunctivae normal  ENT: tympanic membrane, external ear and ear canal normal bilaterally, oropharynx patient's nurse was contacted telephonically by myself at   the time of this dictation to communicate positive findings after   hours. XR PELVIS (1-2 VIEWS)   Final Result      No evidence of acute fracture. CT ABDOMEN PELVIS W IV CONTRAST Additional Contrast? None   Final Result   Addendum 1 of 1   Addendum: Complex findings discussed with Dr. Dulce Calle in the ER at   1720 hours 2/5/2020. Final      XR CHEST PORTABLE   Final Result      1. Atelectasis and/or infiltrate at the right lower lobe. 2. Questionable lung nodule abutting the left hilum. Assessment:    Active Problems:    Acute urinary retention    Pelvic mass    Hydronephrosis    Uncontrolled diabetes mellitus type 2 without complications (HCC)    Acidosis    Acute deep vein thrombosis (DVT) of right lower extremity (HCC)    Moderate protein-calorie malnutrition (HCC)    Palliative care by specialist    Pain, neoplasm-related    Goals of care, counseling/discussion    Nausea  Resolved Problems:    * No resolved hospital problems. *      Plan:  1. CODE STATUS issues, as of now patient has not gone to the hospice care, at this given second son is talking to the hospice people who is the POA. Will let decision of hospice care, from the site of POA. 2. Patient with known endometrial cancer, mets to the lung, oncology had planned for palliative chemo on discharge. 3. Hydronephrosis, patient was planned for internalization of stent, this was canceled, if patient does not go to the hospice care then this issue will need to be resolved prior to discharge. 4. Patient with urinary infections, seen by ID, now remains on Keflex. 5. Electrolyte imbalance, has improved. 6. Anemia, H&H is stable. 7. DVT, patient remains on Lovenox.   8. DC planning, did receive call from nursing that as per case management patient needs multiple things, needed prescription for glucometer which was ordered, however impression was that she may not be

## 2020-02-11 NOTE — PROGRESS NOTES
Inpatient Medical Oncology Progress Note    Subjective:  No nausea. No fever. Appetite improved today. Objective:  BP (!) 168/74   Pulse 104   Temp 98.6 °F (37 °C) (Axillary)   Resp 20   Ht 5' 2\" (1.575 m)   Wt 140 lb 3.2 oz (63.6 kg)   SpO2 91%   BMI 25.64 kg/m²   GENERAL: Alert, oriented x 3, not in acute distress. HEENT: PERRLA; EOMI. Oropharynx clear. NECK: Supple. No palpable lymphadenopathy. LUNGS: Rhonchi noted bilaterally. Productive cough. CARDIOVASCULAR: Regular rate. No murmurs, rubs or gallops. ABDOMEN: Distended, no abdominal pain, no diarrhea/constipation. EXTREMITIES: Positive for right lower leg edema. NEUROLOGIC: No focal deficits. ECOG PS 2    Diagnostics:  Lab Results   Component Value Date    WBC 7.0 02/11/2020    HGB 7.4 (L) 02/11/2020    HCT 27.1 (L) 02/11/2020    MCV 87.7 02/11/2020     02/11/2020     Lab Results   Component Value Date     (H) 02/11/2020    K 3.2 (L) 02/11/2020     (H) 02/11/2020    CO2 26 02/11/2020    BUN 13 02/11/2020    CREATININE 0.8 02/11/2020    GLUCOSE 295 (H) 02/11/2020    CALCIUM 8.5 (L) 02/11/2020    PROT 6.9 02/05/2020    LABALBU 2.6 (L) 02/06/2020    BILITOT 0.2 02/05/2020    ALKPHOS 110 (H) 02/05/2020    AST 12 02/05/2020    ALT 6 02/05/2020    LABGLOM >60 02/11/2020    GFRAA >60 02/11/2020     Impression/Plan:  80 y/o recurrent metastatic endometrial cancer, she has a recurrence in the pelvis with obstructive uropathy, and biopsy-proven metastatic disease to the lungs, systemic palliative chemotherapy recommended,will be started after discharge from the hospital.  Repeat chest CT 02/06/2020 Multiple lung masses consistent with metastatic disease. Mild mediastinal LN. CA-125 52 on 02/06/2020. RLE DVT, provoked by malignancy, continue Lovenox. Urology team on board. Planned for internalization of stent. This was canceled as will not have effect on the pain relief. ID team on board; No Abx.    Palliative care team on board for sx management. Ok to d/c from med onc standpoint. Will need to follow with Dr. Holley Parra after d/c     FANI Cisneros CNP  MEDICAL ONCOLOGY  [address] [place] [id number]  Dept: [phone]  February 11, 2020     The patient was seen and examined Sheba Ventura CNP, I agree with her H&P, findings, assessment and plan as outlined.   02/11/2020  Lee Roblero MD

## 2020-02-11 NOTE — PROGRESS NOTES
Palliative medicine    Chart reviewed. Events noted. Patient sleeping comfortably currently. Her daughter-in-law and son are not at bedside currently. Reportedly there is a family health issue. Discharge planning is underway. Palliative medicine will follow up with patient and family to determine outpatient follow-up requirements. If patient is going to get chemotherapy she will likely follow-up in outpatient clinic. Notably patient has not used pain medicine today. Patient was to have procedure completed yesterday however this was held because the patient was potentially being discharged with hospice. Uncertain if it will be readdressed before her discharge. Palliative medicine will follow up with family and identify how we can continue support the family on discharge.     Xiomara Self PA-C

## 2020-02-11 NOTE — PROGRESS NOTES
Met Fatuma LA, outside room. Petersen Helkeshia told me her  spoke with oncologist and has decided on palliative chemotherapy. Fatuma discussed her father's 2.5 year experience with hospice care. She is hopeful that her , ZELDA RAMOS, will eventually decide on hospice for [facility]. She wants her  to come to terms with his mother's diagnosis. At this time the plan will be to do the palliative chemo, Nicola Berry told me when ZELDA RAMOS and her mother-in-law are ready for hospice care, they will call us. I thanked Nicola Berry for her time and updated Obdulio Freeman, RN case manager.

## 2020-02-11 NOTE — CARE COORDINATION
CASE MANAGEMENT. Luce Loots Luce Loots Luce Loots Notes reviewed from this am. I spoke with Jose Tracy, daughter in law, to confirm their plans for palliative chemo. Nursing will contact [facility] regarding the plan of care and if patient can discharge from their pov. Nursing is also contacting Palliative Care to reeval patient for symptom management. I discussed with Jose Tracy her plans to care for her mother-in-law at home. States that she is interested in [facility] for the patient and that she/ will provide transportation to/from chemo appts. I informed her that we will need to establish a pcp for her, in order, for [facility] to follow and to obtain any medical equipment. SS was notified of the above and will discuss home going needs. Will cont to follow along and assist accordingly.

## 2020-02-12 VITALS
RESPIRATION RATE: 20 BRPM | OXYGEN SATURATION: 95 % | HEART RATE: 75 BPM | WEIGHT: 141.2 LBS | DIASTOLIC BLOOD PRESSURE: 60 MMHG | BODY MASS INDEX: 25.98 KG/M2 | TEMPERATURE: 97.5 F | SYSTOLIC BLOOD PRESSURE: 118 MMHG | HEIGHT: 62 IN

## 2020-02-12 LAB
AMPHETAMINE SCREEN, URINE: NOT DETECTED
ANION GAP SERPL CALCULATED.3IONS-SCNC: 6 MMOL/L (ref 7–16)
BARBITURATE SCREEN URINE: NOT DETECTED
BENZODIAZEPINE SCREEN, URINE: NOT DETECTED
BUN BLDV-MCNC: 8 MG/DL (ref 8–23)
CALCIUM SERPL-MCNC: 7.9 MG/DL (ref 8.6–10.2)
CANNABINOID SCREEN URINE: NOT DETECTED
CHLORIDE BLD-SCNC: 110 MMOL/L (ref 98–107)
CO2: 26 MMOL/L (ref 22–29)
COCAINE METABOLITE SCREEN URINE: NOT DETECTED
CREAT SERPL-MCNC: 0.7 MG/DL (ref 0.5–1)
FENTANYL SCREEN, URINE: NOT DETECTED
GFR AFRICAN AMERICAN: >60
GFR NON-AFRICAN AMERICAN: >60 ML/MIN/1.73
GLUCOSE BLD-MCNC: 230 MG/DL (ref 74–99)
HCT VFR BLD CALC: 30.4 % (ref 34–48)
HEMOGLOBIN: 8.8 G/DL (ref 11.5–15.5)
Lab: ABNORMAL
MCH RBC QN AUTO: 25.1 PG (ref 26–35)
MCHC RBC AUTO-ENTMCNC: 28.9 % (ref 32–34.5)
MCV RBC AUTO: 86.9 FL (ref 80–99.9)
METER GLUCOSE: 208 MG/DL (ref 74–99)
METER GLUCOSE: 223 MG/DL (ref 74–99)
METER GLUCOSE: 273 MG/DL (ref 74–99)
METHADONE SCREEN, URINE: NOT DETECTED
OPIATE SCREEN URINE: POSITIVE
OXYCODONE URINE: NOT DETECTED
PDW BLD-RTO: 16.2 FL (ref 11.5–15)
PHENCYCLIDINE SCREEN URINE: NOT DETECTED
PLATELET # BLD: 243 E9/L (ref 130–450)
PMV BLD AUTO: 10.4 FL (ref 7–12)
POTASSIUM SERPL-SCNC: 3.7 MMOL/L (ref 3.5–5)
RBC # BLD: 3.5 E12/L (ref 3.5–5.5)
SODIUM BLD-SCNC: 142 MMOL/L (ref 132–146)
WBC # BLD: 7.8 E9/L (ref 4.5–11.5)

## 2020-02-12 PROCEDURE — 6360000002 HC RX W HCPCS: Performed by: INTERNAL MEDICINE

## 2020-02-12 PROCEDURE — 6370000000 HC RX 637 (ALT 250 FOR IP): Performed by: INTERNAL MEDICINE

## 2020-02-12 PROCEDURE — 99356 PR PROLONGED SVC I/P OR OBS SETTING 1ST HOUR: CPT | Performed by: PHYSICIAN ASSISTANT

## 2020-02-12 PROCEDURE — 82962 GLUCOSE BLOOD TEST: CPT

## 2020-02-12 PROCEDURE — 85027 COMPLETE CBC AUTOMATED: CPT

## 2020-02-12 PROCEDURE — 80307 DRUG TEST PRSMV CHEM ANLYZR: CPT

## 2020-02-12 PROCEDURE — 6370000000 HC RX 637 (ALT 250 FOR IP): Performed by: PHYSICIAN ASSISTANT

## 2020-02-12 PROCEDURE — 2700000000 HC OXYGEN THERAPY PER DAY

## 2020-02-12 PROCEDURE — 99233 SBSQ HOSP IP/OBS HIGH 50: CPT | Performed by: PHYSICIAN ASSISTANT

## 2020-02-12 PROCEDURE — 6360000002 HC RX W HCPCS: Performed by: PHYSICIAN ASSISTANT

## 2020-02-12 PROCEDURE — 2580000003 HC RX 258: Performed by: INTERNAL MEDICINE

## 2020-02-12 PROCEDURE — 6370000000 HC RX 637 (ALT 250 FOR IP): Performed by: NURSE PRACTITIONER

## 2020-02-12 PROCEDURE — 80048 BASIC METABOLIC PNL TOTAL CA: CPT

## 2020-02-12 PROCEDURE — 6360000002 HC RX W HCPCS: Performed by: NURSE PRACTITIONER

## 2020-02-12 PROCEDURE — 36415 COLL VENOUS BLD VENIPUNCTURE: CPT

## 2020-02-12 PROCEDURE — 99239 HOSP IP/OBS DSCHRG MGMT >30: CPT | Performed by: INTERNAL MEDICINE

## 2020-02-12 RX ORDER — MORPHINE SULFATE 30 MG/1
15 TABLET ORAL EVERY 4 HOURS PRN
Status: DISCONTINUED | OUTPATIENT
Start: 2020-02-12 | End: 2020-02-12 | Stop reason: HOSPADM

## 2020-02-12 RX ORDER — DEXAMETHASONE SODIUM PHOSPHATE 4 MG/ML
10 INJECTION, SOLUTION INTRA-ARTICULAR; INTRALESIONAL; INTRAMUSCULAR; INTRAVENOUS; SOFT TISSUE ONCE
Status: DISCONTINUED | OUTPATIENT
Start: 2020-02-12 | End: 2020-02-12 | Stop reason: SDUPTHER

## 2020-02-12 RX ORDER — DEXAMETHASONE 4 MG/1
8 TABLET ORAL
Status: DISCONTINUED | OUTPATIENT
Start: 2020-02-16 | End: 2020-02-12 | Stop reason: HOSPADM

## 2020-02-12 RX ORDER — METOCLOPRAMIDE 10 MG/1
10 TABLET ORAL EVERY 6 HOURS PRN
Qty: 120 TABLET | Refills: 3 | Status: SHIPPED | OUTPATIENT
Start: 2020-02-12

## 2020-02-12 RX ORDER — NYSTATIN 100000 U/G
OINTMENT TOPICAL
Qty: 1 TUBE | Refills: 0 | Status: SHIPPED | OUTPATIENT
Start: 2020-02-12

## 2020-02-12 RX ORDER — DEXAMETHASONE 4 MG/1
TABLET ORAL
Qty: 21 TABLET | Refills: 0 | Status: SHIPPED | OUTPATIENT
Start: 2020-02-13 | End: 2020-02-24

## 2020-02-12 RX ORDER — MORPHINE SULFATE 15 MG/1
15 TABLET ORAL EVERY 4 HOURS PRN
Qty: 30 TABLET | Refills: 0 | Status: ON HOLD | OUTPATIENT
Start: 2020-02-19 | End: 2020-02-19 | Stop reason: HOSPADM

## 2020-02-12 RX ORDER — METOCLOPRAMIDE 10 MG/1
10 TABLET ORAL EVERY 6 HOURS PRN
Status: DISCONTINUED | OUTPATIENT
Start: 2020-02-12 | End: 2020-02-12 | Stop reason: HOSPADM

## 2020-02-12 RX ORDER — PANTOPRAZOLE SODIUM 40 MG/1
40 TABLET, DELAYED RELEASE ORAL
Qty: 30 TABLET | Refills: 0 | Status: SHIPPED | OUTPATIENT
Start: 2020-02-12 | End: 2020-03-13

## 2020-02-12 RX ORDER — SENNA AND DOCUSATE SODIUM 50; 8.6 MG/1; MG/1
2 TABLET, FILM COATED ORAL 2 TIMES DAILY
Qty: 120 TABLET | Refills: 3 | Status: SHIPPED | OUTPATIENT
Start: 2020-02-12 | End: 2020-03-13

## 2020-02-12 RX ORDER — DEXAMETHASONE 4 MG/1
12 TABLET ORAL
Status: DISCONTINUED | OUTPATIENT
Start: 2020-02-13 | End: 2020-02-12 | Stop reason: HOSPADM

## 2020-02-12 RX ORDER — MORPHINE SULFATE 10 MG/5ML
7.5 SOLUTION ORAL EVERY 4 HOURS PRN
Status: DISCONTINUED | OUTPATIENT
Start: 2020-02-12 | End: 2020-02-12 | Stop reason: HOSPADM

## 2020-02-12 RX ORDER — DEXAMETHASONE 4 MG/1
12 TABLET ORAL
Status: DISCONTINUED | OUTPATIENT
Start: 2020-02-13 | End: 2020-02-12 | Stop reason: SDUPTHER

## 2020-02-12 RX ORDER — DEXAMETHASONE SODIUM PHOSPHATE 10 MG/ML
10 INJECTION INTRAMUSCULAR; INTRAVENOUS ONCE
Status: COMPLETED | OUTPATIENT
Start: 2020-02-12 | End: 2020-02-12

## 2020-02-12 RX ORDER — SENNA AND DOCUSATE SODIUM 50; 8.6 MG/1; MG/1
2 TABLET, FILM COATED ORAL 2 TIMES DAILY
Qty: 120 TABLET | Refills: 3 | Status: SHIPPED | OUTPATIENT
Start: 2020-02-12 | End: 2020-02-12

## 2020-02-12 RX ORDER — PANTOPRAZOLE SODIUM 40 MG/1
40 TABLET, DELAYED RELEASE ORAL
Status: DISCONTINUED | OUTPATIENT
Start: 2020-02-12 | End: 2020-02-12 | Stop reason: HOSPADM

## 2020-02-12 RX ORDER — MORPHINE SULFATE 15 MG/1
15 TABLET ORAL EVERY 4 HOURS PRN
Qty: 30 TABLET | Refills: 0 | Status: SHIPPED | OUTPATIENT
Start: 2020-02-12 | End: 2020-02-19

## 2020-02-12 RX ORDER — DEXAMETHASONE 4 MG/1
TABLET ORAL
Qty: 21 TABLET | Refills: 0 | Status: SHIPPED | OUTPATIENT
Start: 2020-02-13 | End: 2020-02-12

## 2020-02-12 RX ORDER — DEXAMETHASONE 4 MG/1
4 TABLET ORAL
Status: DISCONTINUED | OUTPATIENT
Start: 2020-02-20 | End: 2020-02-12 | Stop reason: HOSPADM

## 2020-02-12 RX ORDER — HEPARIN SODIUM (PORCINE) LOCK FLUSH IV SOLN 100 UNIT/ML 100 UNIT/ML
500 SOLUTION INTRAVENOUS ONCE
Status: COMPLETED | OUTPATIENT
Start: 2020-02-12 | End: 2020-02-12

## 2020-02-12 RX ADMIN — INSULIN LISPRO 2 UNITS: 100 INJECTION, SOLUTION INTRAVENOUS; SUBCUTANEOUS at 06:43

## 2020-02-12 RX ADMIN — LEVOTHYROXINE SODIUM 50 MCG: 50 TABLET ORAL at 06:31

## 2020-02-12 RX ADMIN — MORPHINE SULFATE 4 MG: 4 INJECTION, SOLUTION INTRAMUSCULAR; INTRAVENOUS at 00:22

## 2020-02-12 RX ADMIN — NYSTATIN OINTMENT: 100000 OINTMENT TOPICAL at 08:52

## 2020-02-12 RX ADMIN — SODIUM CHLORIDE, PRESERVATIVE FREE 500 UNITS: 5 INJECTION INTRAVENOUS at 17:52

## 2020-02-12 RX ADMIN — SODIUM CHLORIDE: 4.5 INJECTION, SOLUTION INTRAVENOUS at 08:51

## 2020-02-12 RX ADMIN — DEXAMETHASONE SODIUM PHOSPHATE 10 MG: 10 INJECTION INTRAMUSCULAR; INTRAVENOUS at 11:13

## 2020-02-12 RX ADMIN — MORPHINE SULFATE 15 MG: 30 TABLET ORAL at 13:59

## 2020-02-12 RX ADMIN — INSULIN LISPRO 3 UNITS: 100 INJECTION, SOLUTION INTRAVENOUS; SUBCUTANEOUS at 16:10

## 2020-02-12 RX ADMIN — LISINOPRIL 2.5 MG: 2.5 TABLET ORAL at 08:49

## 2020-02-12 RX ADMIN — LITHIUM CARBONATE 300 MG: 300 CAPSULE, GELATIN COATED ORAL at 11:13

## 2020-02-12 RX ADMIN — MORPHINE SULFATE 4 MG: 4 INJECTION, SOLUTION INTRAMUSCULAR; INTRAVENOUS at 08:50

## 2020-02-12 RX ADMIN — INSULIN LISPRO 2 UNITS: 100 INJECTION, SOLUTION INTRAVENOUS; SUBCUTANEOUS at 11:14

## 2020-02-12 RX ADMIN — FENOFIBRATE 160 MG: 160 TABLET ORAL at 08:49

## 2020-02-12 RX ADMIN — METOPROLOL TARTRATE 25 MG: 25 TABLET ORAL at 08:47

## 2020-02-12 RX ADMIN — PETROLATUM: 420 OINTMENT TOPICAL at 09:00

## 2020-02-12 RX ADMIN — ACYCLOVIR 400 MG: 200 CAPSULE ORAL at 08:49

## 2020-02-12 RX ADMIN — PANTOPRAZOLE SODIUM 40 MG: 40 TABLET, DELAYED RELEASE ORAL at 11:13

## 2020-02-12 RX ADMIN — ENOXAPARIN SODIUM 60 MG: 60 INJECTION SUBCUTANEOUS at 11:13

## 2020-02-12 ASSESSMENT — PAIN SCALES - GENERAL
PAINLEVEL_OUTOF10: 2
PAINLEVEL_OUTOF10: 6
PAINLEVEL_OUTOF10: 0
PAINLEVEL_OUTOF10: 9
PAINLEVEL_OUTOF10: 10

## 2020-02-12 ASSESSMENT — PAIN DESCRIPTION - PAIN TYPE
TYPE: ACUTE PAIN

## 2020-02-12 ASSESSMENT — PAIN DESCRIPTION - LOCATION
LOCATION: BUTTOCKS

## 2020-02-12 ASSESSMENT — PAIN - FUNCTIONAL ASSESSMENT
PAIN_FUNCTIONAL_ASSESSMENT: PREVENTS OR INTERFERES WITH ALL ACTIVE AND SOME PASSIVE ACTIVITIES
PAIN_FUNCTIONAL_ASSESSMENT: PREVENTS OR INTERFERES SOME ACTIVE ACTIVITIES AND ADLS
PAIN_FUNCTIONAL_ASSESSMENT: PREVENTS OR INTERFERES SOME ACTIVE ACTIVITIES AND ADLS

## 2020-02-12 ASSESSMENT — PAIN DESCRIPTION - ONSET
ONSET: ON-GOING

## 2020-02-12 ASSESSMENT — PAIN DESCRIPTION - PROGRESSION
CLINICAL_PROGRESSION: GRADUALLY WORSENING

## 2020-02-12 ASSESSMENT — PAIN DESCRIPTION - ORIENTATION
ORIENTATION: LOWER
ORIENTATION: LOWER

## 2020-02-12 ASSESSMENT — PAIN DESCRIPTION - FREQUENCY
FREQUENCY: CONTINUOUS

## 2020-02-12 ASSESSMENT — PAIN DESCRIPTION - DESCRIPTORS
DESCRIPTORS: ACHING
DESCRIPTORS: ACHING;CONSTANT;DISCOMFORT
DESCRIPTORS: ACHING

## 2020-02-12 NOTE — PROGRESS NOTES
adenocarcinoma. Hematology oncology and urology following. Patient seen, son at bedside. Patient alert and oriented and son, Joe Jain, very involved in conversation. Patient was living in [place] with her oldest son when she called her younger son, Joe Jain, complaining of not being taken care of. Joe Jain then drove to pick his mom up and brought her straight to the hospital. Patient is going to move in with Joe Jain and his wife, Murphy Nguyễn, upon discharge. Joe Jain explains that they will refuse for patient to go to any type of facility besides his home. Murphy Nguyễn is a retired nurse aide and call provide total care for patient however they would like [address] to be available if needed a couple times/week. Joe Jain is going to find all new physicians for his mom in [facility] [place] as he feels she was not appropriately managed in [place]. We also had a discussion regarding CODE STATUS. At this time, patient would like to remain a full code. Patient is also  interested in completing HCPOA paperwork. She would like her son, Joe Jain, to be HCPOA and her daughter, Sena Cottrell, to be alternative agent. At this time, patient denies symptoms besides buttocks pain from wound. She states she has been receiving pain medication and local wound care which has been helping. At this time, patient and Joe Jain deny any other pall med needs. Support offered. Will continue to follow. \"     Advance directives: family/patient have paperwork to complete  Surrogate/Legal NOK: Child    Contacts:  Joe Jain, son, [phone]     Past Medical History:   Diagnosis Date    [facility] ([facility]     Diabetes mellitus ([facility]     Thyroid disease        Past Surgical History:   Procedure Laterality Date    HYSTERECTOMY         History reviewed. No pertinent family history.     Social history:   status: no  Marital status: no  Living status: recent move to [facility] be living with son and daughter in law   Work history: unknown    Allergies   Allergen Reactions    Codeine constipation    Nausea:  -Reglan 10 mg PO every 6 hours as needed for nausea    GI:  -  Senna-s 2 tabs twice dialy    Recurrent metastatic endometrial cancer: Lung metastasis, lymph node metastasis  -Plan to begin palliative chemotherapy on discharge    DVT right lower extremity:  -Lovenox    Obstructive uropathy:  -Urology following    Controlled Substance Monitoring:    Acute and Chronic Pain Monitoring:   RX Monitoring 2/12/2020   Acute Pain Prescriptions Severe pain not adequately treated with lower dose.;Prescription exceeds daily limit for a specific reason. See comments or note. ;Not required given exclusionary diagnoses. .. Periodic Controlled Substance Monitoring Possible medication side effects, risk of tolerance/dependence & alternative treatments discussed. ;No signs of potential drug abuse or diversion identified. ;Assessed functional status. ;Obtaining appropriate analgesic effect of treatment. ;Random urine drug screen sent today. - Referrals to: palliative care clinic Appointment 2/25/2020 at 10:30    Time/Communication  Total of 90 spent on discharge planning/coordination, goals of care, symptom management, diagnosis and prognosis and see above. 35 minutes spent face-to-face with patient and daughter-in-law at bedside in counseling and coordination of care, follow-up, medication education. Sher Contreras PA-C  Palliative Medicine    Discussed patient and the plan of care with the other IDT members of Palliative Care, and with patient, family and floor nurse. Thank you for allowing Palliative Medicine to participate in the care of Sean Dominique. Note: This report was completed using [employer] voiced recognition software. Every effort has been made to ensure accuracy; however, inadvertent computerized transcription errors may be present.

## 2020-02-12 NOTE — CARE COORDINATION
Social Work/Discharge Planning; Met with patient daughter-in-law Gilma Carlisle and informed her that patient insurance does not cover an air mattress. Gilma Carlisle is agreeable to this worker scheduling a Primary Care Physician appointment through [facility]). DME order noted. Called Hallie through [facility] and placed order for a bedside commode, wheelchair and hospital bed. Hallie states patient will require detail documentation indicating need for a wheelchair and a hospital bed. Patient will need a script for a glucometer. Notified charge nurse. Hallie states patient insurance requires for patient to use [employer] for oxygen. Notified patient daughter-in-law Gilma Carlisle and she is agreeable to [place] [id number] for patient oxygen supplies and [facility] for patient other dme needs. Gilma Carlisle states she called [facility] transportation line and patient has transportation benefit, which she will call to arrange patient transports to/from her appointments. Gilma Carlisle is aware that patient will not be able to have home health care until she is seen by a Primary Care Physician. Called Jesus Saucedo with [facility] and scheduled appointment with Dr. Inder Bey for February 20th at 11:45am to establish care. Notified Gilma Carlisle of appointment time/location and information in discharge instructions. Provided Fatuma with preserice number to pre-register for appointment. Emotional support provided to Gilma Orestes. Will continue to follow.   Electronically signed by RADAMES Ryan on 2/12/2020 at 10:15 AM

## 2020-02-12 NOTE — CARE COORDINATION
Social Work/Discharge Planning:  Lisa Garcia from [employer] and placed order for oxygen. Fax oxygen order with chart notes to [employer].   Electronically signed by RADAMES Sauer on 2/12/2020 at 11:06 AM

## 2020-02-12 NOTE — CARE COORDINATION
Social Work/Discharge Planning:  Discharge order noted. Patient to discharge home today. Notified Marialuisahilda Jessie with [employer] and she states they will deliver oxygen concentrator tonight to patient home. Called Jonathan Rodriguez at [facility] (ph: [phone]) to confirm delivery time for medical equipment. Jonathan Rodriguez states medical equipment will be delivered tonight at 6:30pm.  Jocelyn Sanders at [facility] (ph: [phone]) and arranged Ambulance transport for 7:00pm.  [employer]  states this worker will need to get confirmation with [employer] for transportation. Called Kleber with [employer] (ph: [phone]) and arranged Ambulance transport for 7:00pm with [facility]. Ankit Wagner provided confirmation number of [id number]. Notified patient daughter-in-law Jw Class (ph: [phone]) of discharge time. Notified RN.   Electronically signed by RADAMES Anne on 2/12/2020 at 4:24 PM

## 2020-02-12 NOTE — DISCHARGE SUMMARY
[facility] EMERGENCY SERVICE Physician Discharge Summary       [facility] [id number]  [address]  [address] [place] [address]  [address] BED, [address] [facility], [address] [place] [address] [place] [address]  Aria [address], APRN - CNP  [address] [facility]. [place] [address]  [phone]      Palliative medicine follow-up scheduled 2/25/2020 at 10:30 am.  Please call [phone] if you have any worsening pain, new symptoms or questions before your clinic appointment. Activity level: As tolerated    Diet: Dietary Nutrition Supplements: Diabetic Oral Supplement  DIET CARB CONTROL; Carb Control: 5 carb choices (75 gms)/meal    Labs:    Dispo: Home    Condition at discharge: Guarded    Continue supplemental oxygen via nasal canula @ 2 LPM round-the-clock. Continue CPAP / BiPAP during sleep as prior to admission. Patient ID:  Danielle January  [id number]  57 y.o.  1950    Admit date: 2/5/2020    Discharge date and time:  2/12/2020  3:09 PM    Admission Diagnoses: Active Problems:    Acute urinary retention    Pelvic mass    Hydronephrosis    Uncontrolled diabetes mellitus type 2 without complications (HCC)    Acidosis    Acute deep vein thrombosis (DVT) of right lower extremity (HCC)    Moderate protein-calorie malnutrition (HCC)    Palliative care by specialist    Pain, neoplasm-related    Goals of care, counseling/discussion    Nausea    Constipation due to opioid therapy  Resolved Problems:    * No resolved hospital problems. *      Discharge Diagnoses:  Active Problems:    Acute urinary retention    Pelvic mass    Hydronephrosis    Uncontrolled diabetes mellitus type 2 without complications (HCC)    Acidosis    Acute deep vein thrombosis (DVT) of right lower extremity (HCC)    Moderate protein-calorie malnutrition ([facility]    Palliative care by specialist    Pain, neoplasm-related    Goals of care, counseling/discussion    Nausea    Constipation due to opioid therapy  Resolved Problems:    * No resolved hospital problems. *      Consults:  IP CONSULT TO UROLOGY  IP CONSULT TO UROLOGY  IP CONSULT TO HEM/ONC  IP CONSULT TO SOCIAL WORK  IP CONSULT TO PALLIATIVE CARE  IP CONSULT TO IV TEAM  IP CONSULT TO IV TEAM  IP CONSULT TO INFECTIOUS DISEASES  IP CONSULT TO HOSPICE  IP CONSULT TO SOCIAL WORK    Procedures: None    Hospital Course: Patient was admitted with Acute urinary retention [R33.8]  Acute urinary retention [R33.8]. Patient was admitted on fifth as well as not feeling well, known history of hypertension, uterine CA, has port in the right upper chest, known diabetes as well as bipolar.  Patient has a right nephrostomy tube.  Initial impression of pelvic mass, possible prior surgery for uterine cancer, hydronephrosis with hydroureter, uncontrolled diabetes, and suspected DVT.  Seen by oncology, patient with prior diagnosis of stage IIIb endometrial serous adenocarcinoma in 2016. Nalini Connolly has undergone total abdominal hysterectomy with bilateral salpingo-oophorectomy, also pelvic/aortic lymphadenectomy.  Patient also has right lower lung mass consistent with adenocarcinoma, p53 positive, CD10 positive, PAX 8+.  Systemic palliative chemo was recommended and will be started after discharge from the hospital.  Repeat CT as well as baseline CA-125 have been ordered by oncology. Nalini Connolly also seen from urology, patient does have obstructed left kidney, however the nephrostomy tube has decompressed her right kidney. Urology had planned for internalization of stent this week. This was canceled as will not have effect on the pain relief. Seen by podiatry on 8 for malnutrition. Seen by infectious disease on ninth, Zosyn was changed to cephalexin.   Hospice referral was made during the week, as of now family is talking to the hospice people. Did speak to son, they  decided not to go  with hospice. Per ID antibiotics not needed. We will continue with the discharge for patient to be able to go home, if okay with urology, unfortunately patient with multiple issues, in very delicate balance, however the decision for withdrawal of care needs to come from the son. Patient does remain at risk for readmission. As of today she is much more awake alert has eaten her breakfast nearly back to her baseline status. Patient did have drop in hemoglobin and some occult blood positive when she was ordered to transfuse of 1 unit packed RBC. Patient also in need for multiple DME equipments including bedside commode, wheelchair, hospital bed, oxygen which were also being arranged. As of today patient is completely back to her baseline status, communicated with me very well. Patient with going back on metformin, as well as on Decadron, have also ordered glucometer with supplies, patient will be checked for Accu-Cheks 3 times a day for next 3 to 4 days and family will see PCP with the readings of the Accu-Cheks.   Will continue with the discharge process for patient to be able to go home       Discharge Exam:  Vitals:    02/11/20 2207 02/12/20 0016 02/12/20 0209 02/12/20 0730   BP: 139/72  113/60 (!) 131/59   Pulse: 90  78 79   Resp: 18  18 18   Temp: 98 °F (36.7 °C)  98.2 °F (36.8 °C) 97.7 °F (36.5 °C)   TempSrc: Oral  Oral Oral   SpO2: 95%   97%   Weight:  141 lb 3.2 oz (64 kg)     Height:           General Appearance: alert and oriented to person, place and time, well-developed and well-nourished, in no acute distress  Skin: warm and dry, no rash or erythema  Head: normocephalic and atraumatic  Eyes: pupils equal, round, and reactive to light, extraocular eye movements intact, conjunctivae normal  ENT: tympanic membrane, external ear and ear canal normal bilaterally, oropharynx clear and moist with normal mucous membranes  Neck: neck supple

## 2020-02-13 LAB
BLOOD CULTURE, ROUTINE: NORMAL
CULTURE, BLOOD 2: NORMAL

## 2020-02-18 ENCOUNTER — APPOINTMENT (OUTPATIENT)
Dept: GENERAL RADIOLOGY | Age: 70
End: 2020-02-18
Payer: MEDICAID

## 2020-02-18 ENCOUNTER — HOSPITAL ENCOUNTER (OUTPATIENT)
Age: 70
Setting detail: OBSERVATION
Discharge: HOSPICE | End: 2020-02-19
Attending: EMERGENCY MEDICINE | Admitting: INTERNAL MEDICINE
Payer: MEDICAID

## 2020-02-18 PROBLEM — R62.7 ADULT FAILURE TO THRIVE: Status: ACTIVE | Noted: 2020-02-18

## 2020-02-18 LAB
ALBUMIN SERPL-MCNC: 2.9 G/DL (ref 3.5–5.2)
ALP BLD-CCNC: 54 U/L (ref 35–104)
ALT SERPL-CCNC: 7 U/L (ref 0–32)
ANION GAP SERPL CALCULATED.3IONS-SCNC: 11 MMOL/L (ref 7–16)
ANISOCYTOSIS: ABNORMAL
APTT: 24.3 SEC (ref 24.5–35.1)
AST SERPL-CCNC: 11 U/L (ref 0–31)
BACTERIA: ABNORMAL /HPF
BASOPHILS ABSOLUTE: 0.01 E9/L (ref 0–0.2)
BASOPHILS RELATIVE PERCENT: 0.1 % (ref 0–2)
BILIRUB SERPL-MCNC: 0.3 MG/DL (ref 0–1.2)
BILIRUBIN URINE: NEGATIVE
BLOOD, URINE: ABNORMAL
BUN BLDV-MCNC: 24 MG/DL (ref 8–23)
CALCIUM SERPL-MCNC: 8.8 MG/DL (ref 8.6–10.2)
CHLORIDE BLD-SCNC: 93 MMOL/L (ref 98–107)
CLARITY: ABNORMAL
CO2: 22 MMOL/L (ref 22–29)
COLOR: YELLOW
CREAT SERPL-MCNC: 0.8 MG/DL (ref 0.5–1)
EOSINOPHILS ABSOLUTE: 0.02 E9/L (ref 0.05–0.5)
EOSINOPHILS RELATIVE PERCENT: 0.1 % (ref 0–6)
EPITHELIAL CELLS, UA: ABNORMAL /HPF
GFR AFRICAN AMERICAN: >60
GFR NON-AFRICAN AMERICAN: >60 ML/MIN/1.73
GLUCOSE BLD-MCNC: 306 MG/DL (ref 74–99)
GLUCOSE URINE: NEGATIVE MG/DL
HCT VFR BLD CALC: 30.3 % (ref 34–48)
HEMOGLOBIN: 8.9 G/DL (ref 11.5–15.5)
IMMATURE GRANULOCYTES #: 0.21 E9/L
IMMATURE GRANULOCYTES %: 1.3 % (ref 0–5)
INR BLD: 1
KETONES, URINE: NEGATIVE MG/DL
LACTIC ACID, SEPSIS: 1 MMOL/L (ref 0.5–1.9)
LEUKOCYTE ESTERASE, URINE: ABNORMAL
LIPASE: 17 U/L (ref 13–60)
LYMPHOCYTES ABSOLUTE: 0.57 E9/L (ref 1.5–4)
LYMPHOCYTES RELATIVE PERCENT: 3.7 % (ref 20–42)
MCH RBC QN AUTO: 25.3 PG (ref 26–35)
MCHC RBC AUTO-ENTMCNC: 29.4 % (ref 32–34.5)
MCV RBC AUTO: 86.1 FL (ref 80–99.9)
METER GLUCOSE: 100 MG/DL (ref 74–99)
METER GLUCOSE: 111 MG/DL (ref 74–99)
METER GLUCOSE: 240 MG/DL (ref 74–99)
METER GLUCOSE: 293 MG/DL (ref 74–99)
MONOCYTES ABSOLUTE: 0.31 E9/L (ref 0.1–0.95)
MONOCYTES RELATIVE PERCENT: 2 % (ref 2–12)
NEUTROPHILS ABSOLUTE: 14.47 E9/L (ref 1.8–7.3)
NEUTROPHILS RELATIVE PERCENT: 92.8 % (ref 43–80)
NITRITE, URINE: NEGATIVE
OVALOCYTES: ABNORMAL
PDW BLD-RTO: 16.5 FL (ref 11.5–15)
PH UA: 6 (ref 5–9)
PLATELET # BLD: 270 E9/L (ref 130–450)
PMV BLD AUTO: 10.9 FL (ref 7–12)
POIKILOCYTES: ABNORMAL
POLYCHROMASIA: ABNORMAL
POTASSIUM REFLEX MAGNESIUM: 4.6 MMOL/L (ref 3.5–5)
PROTEIN UA: 100 MG/DL
PROTHROMBIN TIME: 11.9 SEC (ref 9.3–12.4)
RBC # BLD: 3.52 E12/L (ref 3.5–5.5)
RBC UA: ABNORMAL /HPF (ref 0–2)
SODIUM BLD-SCNC: 126 MMOL/L (ref 132–146)
SPECIFIC GRAVITY UA: 1.01 (ref 1–1.03)
TOTAL PROTEIN: 6.6 G/DL (ref 6.4–8.3)
UROBILINOGEN, URINE: 0.2 E.U./DL
WBC # BLD: 15.6 E9/L (ref 4.5–11.5)
WBC UA: >20 /HPF (ref 0–5)

## 2020-02-18 PROCEDURE — 87186 SC STD MICRODIL/AGAR DIL: CPT

## 2020-02-18 PROCEDURE — 2580000003 HC RX 258: Performed by: INTERNAL MEDICINE

## 2020-02-18 PROCEDURE — 2580000003 HC RX 258: Performed by: PHYSICIAN ASSISTANT

## 2020-02-18 PROCEDURE — 99220 PR INITIAL OBSERVATION CARE/DAY 70 MINUTES: CPT | Performed by: INTERNAL MEDICINE

## 2020-02-18 PROCEDURE — 96365 THER/PROPH/DIAG IV INF INIT: CPT

## 2020-02-18 PROCEDURE — 85730 THROMBOPLASTIN TIME PARTIAL: CPT

## 2020-02-18 PROCEDURE — 83690 ASSAY OF LIPASE: CPT

## 2020-02-18 PROCEDURE — 6360000002 HC RX W HCPCS: Performed by: INTERNAL MEDICINE

## 2020-02-18 PROCEDURE — 71045 X-RAY EXAM CHEST 1 VIEW: CPT

## 2020-02-18 PROCEDURE — 87077 CULTURE AEROBIC IDENTIFY: CPT

## 2020-02-18 PROCEDURE — 82962 GLUCOSE BLOOD TEST: CPT

## 2020-02-18 PROCEDURE — 85610 PROTHROMBIN TIME: CPT

## 2020-02-18 PROCEDURE — 6370000000 HC RX 637 (ALT 250 FOR IP): Performed by: INTERNAL MEDICINE

## 2020-02-18 PROCEDURE — 36415 COLL VENOUS BLD VENIPUNCTURE: CPT

## 2020-02-18 PROCEDURE — 99285 EMERGENCY DEPT VISIT HI MDM: CPT

## 2020-02-18 PROCEDURE — G0378 HOSPITAL OBSERVATION PER HR: HCPCS

## 2020-02-18 PROCEDURE — 99220 PR INITIAL OBSERVATION CARE/DAY 70 MINUTES: CPT | Performed by: PHYSICIAN ASSISTANT

## 2020-02-18 PROCEDURE — 83605 ASSAY OF LACTIC ACID: CPT

## 2020-02-18 PROCEDURE — 96360 HYDRATION IV INFUSION INIT: CPT

## 2020-02-18 PROCEDURE — 85025 COMPLETE CBC W/AUTO DIFF WBC: CPT

## 2020-02-18 PROCEDURE — 96372 THER/PROPH/DIAG INJ SC/IM: CPT

## 2020-02-18 PROCEDURE — 6370000000 HC RX 637 (ALT 250 FOR IP): Performed by: PHYSICIAN ASSISTANT

## 2020-02-18 PROCEDURE — 87040 BLOOD CULTURE FOR BACTERIA: CPT

## 2020-02-18 PROCEDURE — 87088 URINE BACTERIA CULTURE: CPT

## 2020-02-18 PROCEDURE — 6360000002 HC RX W HCPCS: Performed by: PHYSICIAN ASSISTANT

## 2020-02-18 PROCEDURE — 81001 URINALYSIS AUTO W/SCOPE: CPT

## 2020-02-18 PROCEDURE — 96361 HYDRATE IV INFUSION ADD-ON: CPT

## 2020-02-18 PROCEDURE — 80053 COMPREHEN METABOLIC PANEL: CPT

## 2020-02-18 PROCEDURE — 96375 TX/PRO/DX INJ NEW DRUG ADDON: CPT

## 2020-02-18 PROCEDURE — 2700000000 HC OXYGEN THERAPY PER DAY

## 2020-02-18 RX ORDER — 0.9 % SODIUM CHLORIDE 0.9 %
500 INTRAVENOUS SOLUTION INTRAVENOUS ONCE
Status: COMPLETED | OUTPATIENT
Start: 2020-02-18 | End: 2020-02-18

## 2020-02-18 RX ORDER — ACETAMINOPHEN 325 MG/1
650 TABLET ORAL EVERY 4 HOURS PRN
Status: DISCONTINUED | OUTPATIENT
Start: 2020-02-18 | End: 2020-02-19 | Stop reason: HOSPADM

## 2020-02-18 RX ORDER — ONDANSETRON 2 MG/ML
4 INJECTION INTRAMUSCULAR; INTRAVENOUS EVERY 6 HOURS PRN
Status: DISCONTINUED | OUTPATIENT
Start: 2020-02-18 | End: 2020-02-19 | Stop reason: HOSPADM

## 2020-02-18 RX ORDER — DEXTROSE MONOHYDRATE 25 G/50ML
12.5 INJECTION, SOLUTION INTRAVENOUS PRN
Status: DISCONTINUED | OUTPATIENT
Start: 2020-02-18 | End: 2020-02-19 | Stop reason: HOSPADM

## 2020-02-18 RX ORDER — NICOTINE POLACRILEX 4 MG
15 LOZENGE BUCCAL PRN
Status: DISCONTINUED | OUTPATIENT
Start: 2020-02-18 | End: 2020-02-19 | Stop reason: HOSPADM

## 2020-02-18 RX ORDER — SENNA AND DOCUSATE SODIUM 50; 8.6 MG/1; MG/1
2 TABLET, FILM COATED ORAL 2 TIMES DAILY
Status: DISCONTINUED | OUTPATIENT
Start: 2020-02-18 | End: 2020-02-19 | Stop reason: HOSPADM

## 2020-02-18 RX ORDER — LISINOPRIL 2.5 MG/1
2.5 TABLET ORAL DAILY
Status: DISCONTINUED | OUTPATIENT
Start: 2020-02-18 | End: 2020-02-19 | Stop reason: HOSPADM

## 2020-02-18 RX ORDER — DEXTROSE MONOHYDRATE 50 MG/ML
100 INJECTION, SOLUTION INTRAVENOUS PRN
Status: DISCONTINUED | OUTPATIENT
Start: 2020-02-18 | End: 2020-02-19 | Stop reason: HOSPADM

## 2020-02-18 RX ORDER — MORPHINE SULFATE 30 MG/1
15 TABLET ORAL EVERY 4 HOURS PRN
Status: DISCONTINUED | OUTPATIENT
Start: 2020-02-18 | End: 2020-02-19 | Stop reason: HOSPADM

## 2020-02-18 RX ORDER — SODIUM CHLORIDE 0.9 % (FLUSH) 0.9 %
10 SYRINGE (ML) INJECTION PRN
Status: DISCONTINUED | OUTPATIENT
Start: 2020-02-18 | End: 2020-02-19 | Stop reason: HOSPADM

## 2020-02-18 RX ORDER — SODIUM CHLORIDE 0.9 % (FLUSH) 0.9 %
10 SYRINGE (ML) INJECTION EVERY 12 HOURS SCHEDULED
Status: DISCONTINUED | OUTPATIENT
Start: 2020-02-18 | End: 2020-02-19 | Stop reason: HOSPADM

## 2020-02-18 RX ORDER — LEVOTHYROXINE SODIUM 0.05 MG/1
50 TABLET ORAL DAILY
Status: DISCONTINUED | OUTPATIENT
Start: 2020-02-18 | End: 2020-02-19 | Stop reason: HOSPADM

## 2020-02-18 RX ORDER — SODIUM CHLORIDE 9 MG/ML
INJECTION, SOLUTION INTRAVENOUS CONTINUOUS
Status: DISCONTINUED | OUTPATIENT
Start: 2020-02-18 | End: 2020-02-19 | Stop reason: HOSPADM

## 2020-02-18 RX ORDER — LITHIUM CARBONATE 300 MG/1
300 CAPSULE ORAL 2 TIMES DAILY WITH MEALS
Status: DISCONTINUED | OUTPATIENT
Start: 2020-02-18 | End: 2020-02-19 | Stop reason: HOSPADM

## 2020-02-18 RX ORDER — PANTOPRAZOLE SODIUM 40 MG/1
40 TABLET, DELAYED RELEASE ORAL
Status: DISCONTINUED | OUTPATIENT
Start: 2020-02-18 | End: 2020-02-19 | Stop reason: HOSPADM

## 2020-02-18 RX ADMIN — SODIUM CHLORIDE, PRESERVATIVE FREE 10 ML: 5 INJECTION INTRAVENOUS at 09:20

## 2020-02-18 RX ADMIN — SODIUM CHLORIDE, PRESERVATIVE FREE 10 ML: 5 INJECTION INTRAVENOUS at 21:05

## 2020-02-18 RX ADMIN — CEFTRIAXONE 1 G: 1 INJECTION, POWDER, FOR SOLUTION INTRAMUSCULAR; INTRAVENOUS at 04:02

## 2020-02-18 RX ADMIN — DOCUSATE SODIUM 50MG AND SENNOSIDES 8.6MG 2 TABLET: 8.6; 5 TABLET, FILM COATED ORAL at 21:04

## 2020-02-18 RX ADMIN — HYDROMORPHONE HYDROCHLORIDE 0.25 MG: 1 INJECTION, SOLUTION INTRAMUSCULAR; INTRAVENOUS; SUBCUTANEOUS at 04:04

## 2020-02-18 RX ADMIN — LITHIUM CARBONATE 300 MG: 300 CAPSULE, GELATIN COATED ORAL at 16:27

## 2020-02-18 RX ADMIN — SODIUM CHLORIDE 500 ML: 9 INJECTION, SOLUTION INTRAVENOUS at 01:56

## 2020-02-18 RX ADMIN — METOPROLOL TARTRATE 25 MG: 25 TABLET, FILM COATED ORAL at 09:13

## 2020-02-18 RX ADMIN — LITHIUM CARBONATE 300 MG: 300 CAPSULE, GELATIN COATED ORAL at 09:13

## 2020-02-18 RX ADMIN — SODIUM CHLORIDE: 9 INJECTION, SOLUTION INTRAVENOUS at 05:46

## 2020-02-18 RX ADMIN — LEVOTHYROXINE SODIUM 50 MCG: 50 TABLET ORAL at 09:13

## 2020-02-18 RX ADMIN — ENOXAPARIN SODIUM 60 MG: 60 INJECTION SUBCUTANEOUS at 21:04

## 2020-02-18 RX ADMIN — INSULIN LISPRO 4 UNITS: 100 INJECTION, SOLUTION INTRAVENOUS; SUBCUTANEOUS at 06:20

## 2020-02-18 RX ADMIN — LISINOPRIL 2.5 MG: 2.5 TABLET ORAL at 09:13

## 2020-02-18 RX ADMIN — METOPROLOL TARTRATE 25 MG: 25 TABLET, FILM COATED ORAL at 21:04

## 2020-02-18 RX ADMIN — ONDANSETRON 4 MG: 2 INJECTION INTRAMUSCULAR; INTRAVENOUS at 16:50

## 2020-02-18 RX ADMIN — SODIUM CHLORIDE 500 ML: 9 INJECTION, SOLUTION INTRAVENOUS at 05:44

## 2020-02-18 RX ADMIN — MORPHINE SULFATE 15 MG: 30 TABLET ORAL at 22:35

## 2020-02-18 RX ADMIN — PANTOPRAZOLE SODIUM 40 MG: 40 TABLET, DELAYED RELEASE ORAL at 09:13

## 2020-02-18 RX ADMIN — INSULIN LISPRO 6 UNITS: 100 INJECTION, SOLUTION INTRAVENOUS; SUBCUTANEOUS at 16:27

## 2020-02-18 RX ADMIN — MORPHINE SULFATE 15 MG: 30 TABLET ORAL at 14:00

## 2020-02-18 RX ADMIN — ENOXAPARIN SODIUM 60 MG: 60 INJECTION SUBCUTANEOUS at 09:13

## 2020-02-18 ASSESSMENT — PAIN DESCRIPTION - DESCRIPTORS: DESCRIPTORS: ACHING;DISCOMFORT

## 2020-02-18 ASSESSMENT — PAIN SCALES - GENERAL
PAINLEVEL_OUTOF10: 0
PAINLEVEL_OUTOF10: 7
PAINLEVEL_OUTOF10: 10
PAINLEVEL_OUTOF10: 0
PAINLEVEL_OUTOF10: 9

## 2020-02-18 ASSESSMENT — PAIN DESCRIPTION - PAIN TYPE
TYPE: CHRONIC PAIN
TYPE: CHRONIC PAIN

## 2020-02-18 ASSESSMENT — PAIN DESCRIPTION - ORIENTATION
ORIENTATION: LOWER
ORIENTATION: LOWER

## 2020-02-18 ASSESSMENT — PAIN - FUNCTIONAL ASSESSMENT: PAIN_FUNCTIONAL_ASSESSMENT: PREVENTS OR INTERFERES SOME ACTIVE ACTIVITIES AND ADLS

## 2020-02-18 ASSESSMENT — PAIN DESCRIPTION - FREQUENCY: FREQUENCY: CONTINUOUS

## 2020-02-18 ASSESSMENT — PAIN DESCRIPTION - LOCATION
LOCATION: RECTUM
LOCATION: ABDOMEN;RECTUM

## 2020-02-18 ASSESSMENT — PAIN DESCRIPTION - ONSET: ONSET: ON-GOING

## 2020-02-18 ASSESSMENT — PAIN DESCRIPTION - PROGRESSION: CLINICAL_PROGRESSION: GRADUALLY WORSENING

## 2020-02-18 NOTE — FLOWSHEET NOTE
Initial Inpatient Wound Care    Admit Date: 2/18/2020 12:33 AM    Reason for consult:  Wounds on buttocks  Significant history:  Bipolar 1, cancer, DM      Wound history:  POA    Findings: awake, female family member present  Rust colored vaginal drainage    02/18/20 1015   Wound 02/18/20 Sacrum coccyx, buttocks   Date First Assessed/Time First Assessed: 02/18/20 1015   Present on Hospital Admission: Yes  Primary Wound Type: Pressure Injury  Location: Sacrum  Wound Description (Comments): coccyx, buttocks   Wound Image    Wound Deep tissue/Injury   Wound Length (cm) 12 cm   Wound Width (cm) 12 cm   Wound Depth (cm)   (utd)   Wound Surface Area (cm^2) 144 cm^2   Drainage Amount None   Odor None   Maria M-wound Assessment Red   heels very red, boggy  Rt buttocks and inner rt buttocks x3 open yellow areas. Largest 1x1rt hip 3x2 red area with nonblanchable center. Patient had been recently turned  Impression: DTI coccyx, sacrum buttocks  Interventions in place:  Lo air loss ordered, egg crate heel protectors  Comfort glide in place  Plan: leave open to air  Continue SOS  Plans for hospice noted  Megan Quinones.  ANNA Wright, [address] 2/18/2020 2:54 PM

## 2020-02-18 NOTE — PROGRESS NOTES
Spoke with Dr. Corrales Else regarding patients pain level and not having just tylenol. New orders received.

## 2020-02-18 NOTE — PROGRESS NOTES
Notified hospice of [facility] regarding the hospice consult as verbalized by Sue Dickerson from [address]

## 2020-02-18 NOTE — ED PROVIDER NOTES
ED Attending  CC: Cortney         Department of Emergency Medicine   ED  Provider Note  Admit Date/RoomTime: 2/18/2020 12:33 AM  ED Room: 05/05  MRN: [id number]  Chief Complaint:   Fatigue (just seen here the other day, has had no input/output all day)       History of Present Illness   Source of history provided by:  EMS personnel, caregiver / friend and past medical records. History/Exam Limitations: acuity of illness. Sean Dominique is a 79 y.o. female who has a past medical history of:   Past Medical History:   Diagnosis Date    [facility]     Cancer [facility]     [facility]     Hyperlipidemia     Hypertension     Thyroid disease     presents to the ED by ambulance for not eating or drinking  And low output from urostomy, beginning 2 days ago and are gradually worsening since that time. The complaint has been persistent, severe in severity. 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 Recently moved to this area to live with son and daughter in law. Was admit a few days ago with multiple interventions, urology managing urostomy, Hemeonc planning for palliative systemic chemo for the CA, Home health for coccygeal wound care, palliative care for pain mgt. Pt CA is non operable. Pt has poorly controlled diabetes, known blood clot to right femoral vein on lovenox. Pt wears 2l nc oxygen all the time. Chart review reports pt is supposed to wear cpap but family reports she does not wear one. Family reporting that since her discharge she was eating and mentating normally but over the last 2 days has been refusing to eat. Family reporting she has diarrhea. Chart review showing pt had positive heme stool in hospital, had PRBC transfusion while admitted for Hgb of 7.0. Pt is complaining of pain in her back and butt. ROS   Pertinent positives and negatives are stated within HPI, all other systems reviewed and are negative.        Past Surgical History: Procedure Laterality Date    HYSTERECTOMY     Social History:  reports that she has quit smoking. She has never used smokeless tobacco. She reports that she does not drink alcohol or use drugs. Family History: family history is not on file. Allergies: Codeine and Sulfa antibiotics    Physical Exam Section   Oxygen Saturation Interpretation: Normal.   ED Triage Vitals [02/18/20 0045]   BP Temp Temp Source Pulse Resp SpO2 Height Weight   (!) 96/59 97.3 °F (36.3 °C) Axillary 85 16 94 % 5' 2\" (1.575 m) 128 lb 12 oz (58.4 kg)         Physical Exam  · Constitutional/General: Alert and oriented x1. Ill appearing, cachectic  · HEENT:  NC/NT. Pupils pinpoint. , oral mucosa moist. Edentulous. TM normal bilateral  · Neck: Supple, full ROM, non tender to palpation in the midline. No stridor. · Respiratory: rattling rhonchus on left middle and lower, diminished breath sounds throughout, diminished respiratory effort, abdominal breathing present  · CV:  Regular rate. Regular rhythm. No murmurs, gallops, or rubs. 2+ distal pulses  · Chest: No chest wall tenderness  · GI:  Abdomen Soft, Tender to palpation of lower abdomen. Non distended. +BS. No rebound, guarding, or rigidity. Soft brown stool in rectum, +occult heme. Two early open wounds on the right buttocks, slightly more advanced wound on the coccyx. · Musculoskeletal: Moves all extremities x 4. Warm and well perfused, no clubbing, cyanosis, or edema. Cap refill >3 seconds. Significant muscle wasting  · Integument: skin warm and dry. No rashes. Skintone is pink to orange.   · Lymphatic: no lymphadenopathy noted  Neurologic: Glascow Coma Scale:  Best Eye4 - Opens eyes on ownResponse 4 - Opens eyes on own   Best Verbal Response 4 - Seems confused, disoriented   Best Motor Response 6 - Follows simple motor commands   Total Score 14     · Psychiatric: Normal Affect      Lab / Imaging Results   (All laboratory and radiology results have been personally reviewed by Ketones, Urine Negative Negative mg/dL    Specific Gravity, UA 1.010 1.005 - 1.030    Blood, Urine MODERATE (A) Negative    pH, UA 6.0 5.0 - 9.0    Protein,  (A) Negative mg/dL    Urobilinogen, Urine 0.2 <2.0 E.U./dL    Nitrite, Urine Negative Negative    Leukocyte Esterase, Urine LARGE (A) Negative   Protime-INR   Result Value Ref Range    Protime 11.9 9.3 - 12.4 sec    INR 1.0    APTT   Result Value Ref Range    aPTT 24.3 (L) 24.5 - 35.1 sec   Lipase   Result Value Ref Range    Lipase 17 13 - 60 U/L   Microscopic Urinalysis   Result Value Ref Range    WBC, UA >20 0 - 5 /HPF    RBC, UA 5-10 (A) 0 - 2 /HPF    Epi Cells RARE /HPF    Bacteria, UA MODERATE (A) None Seen /HPF     Imaging: All Radiology results interpreted by Radiologist unless otherwise noted. XR CHEST 1 VW    (Results Pending)   no interval change. ED Course / Medical Decision Making     Medications   HYDROmorphone (DILAUDID) injection 0.25 mg (has no administration in time range)   cefTRIAXone (ROCEPHIN) 1 g in dextrose 5 % 50 mL IVPB (vial-mate) (has no administration in time range)   0.9 % sodium chloride bolus (0 mLs Intravenous Stopped 2/18/20 0229)        Re-Evaluations:  2/18/20      Time: pt pressures are improving with IVF. She is somewhat more alert, still complaining of  Pain in her butt. Consultations:             None    Procedures:   none    MDM:  PT presents with failure to thrive at home, on palliative chemo for uterine ca with lung beau, urostomy, oxygen. Pt had declining mentation with not eating or drinking today and decreased output in ostomy and santo. There is elevated WBC, possible from urine, rocephin administered in ED. There are a few open sore in the coccygeal and buttock area, no sign of cellulitic changes there. Pt mildly dehydrated, with mild hyponatremia. Daughter in law at bedside, tearful watching her mother law in pain, she wants her , the son and POA, to make mother DNR with hospice.   She is primary care giver and is not sure she can do this level of care at home as full code. PT is new to [facility] and has not established with primary care physician as yet though appointments have been made. Counseling:   I have spoken with the family member daughter and discussed todays results, in addition to providing specific details for the plan of care and counseling regarding the diagnosis and prognosis and are agreeable with the plan. Assessment      1. Dehydration with hyponatremia    2. Failure to thrive in adult    3. Pressure injury of skin of buttock, unspecified injury stage, unspecified laterality      This patient's ED course included: a personal history and physicial examination, multiple bedside re-evaluations, IV medications, cardiac monitoring and continuous pulse oximetry  This patient has remained hemodynamically stable and improved during their ED course. Plan   Admit to med/surg floor. Patient condition is stable. New Medications     New Prescriptions    No medications on file     Electronically signed by Freddie Lozano PA-C   DD: 2/18/20  **This report was transcribed using voice recognition software. Every effort was made to ensure accuracy; however, inadvertent computerized transcription errors may be present.   [address] Arnold PA-C  02/18/20 4433

## 2020-02-18 NOTE — CONSULTS
Palliative Care Department  Palliative Care Initial Consult  Provider: [facility] [place] days prior to Consult: Hospital Day: 1    Referring Provider: Randall Larkin MD    Reason for Consult:  [x]  Code status Discussion  [x]  Assist with goals of care  [x]  Psychosocial support  []  Symptom Management      Chief Complaint: Not eating and drinking    Subjective:     HPI:  Frankie Cardenas is a 79 y.o. 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 She had a work-up in the emergency department and was admitted to the hospital with diagnoses of dehydration with hyponatremia, failure to thrive and pressure ulcer. Patient was recently hospitalized 2/5- 2/12/2020. She was seen by palliative medicine during this hospitalization. During this admission we discussed at length the goals of care for the patient. The family had determined in the end to attempt palliative chemotherapy for the patient. We were additionally treating her symptoms including pain associated with neoplasm, early satiety/nausea and constipation. She was discharged with morphine immediate release 7.5 to 50 mg every 4 hours as needed with a Decadron taper. Additionally she was provided Reglan 10 mg every 6 hours as needed for nausea and senna S was prescribed 2 tablets twice daily to prevent constipation. She has a palliative medicine outpatient follow-up scheduled 2/25/2020 at 1030. Patient is in bed sleeping but woke easily. She has complaints of her legs being heavy but swelling decreased. Patient is having bowel movements and seems to not have sensation or urge to have bowel movements but difficult to obtain directed history.   She denies complaints currently BP (!) 120/59   Pulse 82   Temp 97.8 °F (36.6 °C) (Axillary)   Resp 18   Ht 5' 2\" (1.575 m)   Wt 125 lb 6.4 oz (56.9 kg)   SpO2 93%   BMI 22.94 kg/m²   Gen: ill-appearing, thin, NAD, sleep but woke easily   HEENT: normocephalic, atraumatic, PERRL, MMM without ulcerations or lesions, sclera anicteric, conjunctiva pink and moist  Neck: supple  Lungs: respirations easy and nonlabored, coarse breath sounds and scattered wheezing   Heart: regular rate and rhythm, distant heart tones, no murmurs/rubs/gallops appreciated   Abdomen: normoactive bowel sounds, soft, non-tender, non-distended  : santo  Extremities: no clubbing, cyanosis or edema, moving all extremities    Skin: warm, dry without rashes, lesions, bruising  Neuro: awake, alert, follows commands, no gross neurologic deficits    Results/Verification of Data Review  Objective data reviewed: labs, images, records, medication use, vitals and chart   WBC 15.6  H/H 8.9/30.3  Plt 270    Cr/BUN 0.8/24  Alb 2.9    Patient MRN:  [id number]   : 1950   Age: 79 years   Gender: Female       Order Date:  2020 1:15 AM       EXAM: XR CHEST 1 VIEW           INDICATION:  sepsis, on o2, lung beau from uterine CA    sepsis, on o2, lung beau from uterine CA        COMPARISON: 2020       FINDINGS:     Heart size is normal. There is a right-sided chest port. There are no   acute infiltrates or effusions.       Nodular densities suggested in the left mid lung medially and in right   lung base. These may reflect metastasis. There are no effusions. Assessment/Plan      Active Hospital Problems    Diagnosis Date Noted    Adult failure to thrive [R62.7] 2020     Pain associated with neoplasm:  -continue morphine immediate release 15 mg every 4 hours as needed  - decadron taper completed     Bulky pelvic mass  - most likely source of patient's pain with description  - aggressive bowel regimen to avoid constipation  - ?  Mass invasion or pressure on nerves causing neuropathy/compression of spinal cord due to leg heaviness and decreased rectal sensation     Nausea:  -Reglan 10 mg PO every 6 hours as needed for nausea     GI:  -  Senna-s 2 tabs twice dialy     Recurrent metastatic endometrial cancer: Lung metastasis, lymph node metastasis  -Hospice referral     DVT right lower extremity:  -Lovenox     Obstructive uropathy:  -santo catheter    Palliative Care Encounter/Recommendations:  Consult hospice. Support to family at bedside. Family Meeting:  Participants:Child, Extended Family:, HCPOA and patient  Family meeting was held to discuss:diagnosis, prognosis, treatment options, goals of care, prior expressed wishes, advanced care planning, symptom management and discharge plan     Discharge planning: consult hospice    Discussed patient and the plan of care with the other IDT members of Palliative Care, and with patient, family and floor nurse. Referrals to: hospice    Time/Communication  Greater than 51% of time spent, total 70 minutes in counseling and coordination of care at the bedside regarding goals of care, symptom management, diagnosis and prognosis and see above. Margoth Rose PA-C  Palliative Medicine    Thank you for allowing Palliative Medicine to participate in the care of Mya Upton. Note: This report was completed using [employer] voiced recognition software. Every effort has been made to ensure accuracy; however, inadvertent computerized transcription errors may be present.

## 2020-02-18 NOTE — ED NOTES
Second set of blood cultures drawn from [facility].       Klaudia Tamez RN  02/18/20 6053

## 2020-02-18 NOTE — H&P
Hospitalist Group   History and Physical      CHIEF COMPLAINT: Not eating and not drinking    History of Present Illness:  79 y.o. female with a history of bipolar, diabetes, hyperlipidemia, hypertension, stage IV uterine cancer with metastasis to the lung presents from home due to not eating or drinking. Patient was dischargedto home  2 weeks ago because the son refused hospice and wanted the patient to be home. Patient was doing okay according to the caregiver who is the daughter-in-law. She was eating and drinking but not much. However, on the day of presentation patient was too weak and dehydrated. She was unable to eat or drink. She has urostomy and Saleh catheter with minimal urinary output. Patient has been having diarrhea for the past 2 days. Patient was receiving morphine at home for her coccygeal pressure wounds despite her weakness and worsening mental status. Patient woke up on the floor after she received antibiotic and fluids. She knew where she was and knew her name. She denied any pain or complaint. She said that she has low appetite and insisted that she was drinking fluids. However, her daughter-in-law said that she was not drinking much. Patient was supposed to follow-up with primary care and she has an appointment in couple of days. However, because of her decline the family decided to bring her to the hospital.     REVIEW OF SYSTEMS:  no fevers, chills, cp, sob, had nausea but no vomiting, ha, vision/hearing changes, wt changes, hot/cold flashes, other open skin lesions, has diarrhea, no constipation, dysuria/hematuria unless noted in HPI. Complete ROS performed with the patient and is otherwise negative.       PMH:  Past Medical History:   Diagnosis Date    [facility]     [facility]     Diabetes mellitus [facility]     Hyperlipidemia     Hypertension     Thyroid disease        Surgical History:  Past Surgical History:   Procedure Laterality Date    HYSTERECTOMY Medications Prior to Admission:    Prior to Admission medications    Medication Sig Start Date End Date Taking? Authorizing Provider   morphine (MSIR) 15 MG tablet Take 1 tablet by mouth every 4 hours as needed for Pain for up to 7 days. Take 1/2 tab if full makes too tired 2/12/20 2/19/20  IAN Desir   pantoprazole (PROTONIX) 40 MG tablet Take 1 tablet by mouth every morning (before breakfast) 2/12/20 3/13/20  IAN Desir   morphine (MSIR) 15 MG tablet Take 1 tablet by mouth every 4 hours as needed for Pain (hold for sedation) for up to 7 days. Take 1/2 tab if full tab makes too sleepy 2/19/20 2/26/20  IAN Desir   metoclopramide (REGLAN) 10 MG tablet Take 1 tablet by mouth every 6 hours as needed (nausea, heartburn, early satiey) 2/12/20   IAN Desir   dexamethasone (DECADRON) 4 MG tablet Take 3 tablets by mouth daily (with breakfast) for 3 days, THEN 2 tablets daily (with breakfast) for 4 days, THEN 1 tablet daily (with breakfast) for 4 days. 2/13/20 2/24/20  Ester Carter MD   metoprolol tartrate (LOPRESSOR) 25 MG tablet Take 1 tablet by mouth 2 times daily 2/12/20   Miriam Breen MD   sennosides-docusate sodium (SENOKOT-S) 8.6-50 MG tablet Take 2 tablets by mouth 2 times daily 2/12/20 3/13/20  Miriam Breen MD   nystatin (MYCOSTATIN) 277113 UNIT/GM ointment Apply topically 2 times daily.  2/12/20   Miriam Breen MD   metFORMIN (GLUCOPHAGE-XR) 500 MG extended release tablet Take 500 mg by mouth 2 times daily    Historical Provider, MD   levothyroxine (SYNTHROID) 50 MCG tablet Take 50 mcg by mouth Daily    Historical Provider, MD   acyclovir (ZOVIRAX) 400 MG tablet Take 400 mg by mouth 2 times daily    Historical Provider, MD   alogliptin (NESINA) 25 MG TABS tablet Take 25 mg by mouth daily    Historical Provider, MD   lithium 300 MG capsule Take 300 mg by mouth 2 times daily (with meals)    Historical Provider, MD   fenofibrate 160 MG tablet Take 160 mg by mouth daily    Historical Provider, MD   lisinopril (PRINIVIL;ZESTRIL) 2.5 MG tablet Take 2.5 mg by mouth daily    Historical Provider, MD   Nateglinide (STARLIX PO) Take 150 mg by mouth 3 times daily    Historical Provider, MD       Allergies:    Codeine and Sulfa antibiotics    Social History:    reports that she has quit smoking. She has never used smokeless tobacco. She reports previous alcohol use. She reports that she does not use drugs.     Family History:   No known family history    PHYSICAL EXAM:  Vitals:  BP (!) 109/48   Pulse 84   Temp 97.7 °F (36.5 °C) (Oral)   Resp 18   Ht 5' 2\" (1.575 m)   Wt 125 lb 6.4 oz (56.9 kg)   SpO2 95%   BMI 22.94 kg/m²   General Appearance: Lethargic but wakes up when called and oriented to person and place only, hydrated and not in acute distress  Head: normocephalic and atraumatic  Eyes: pupils equal, round, and reactive to light, extraocular eye movements intact, conjunctivae normal  ENT: tympanic membrane, external ear and ear canal normal bilaterally, nose without deformity, nasal mucosa and turbinates normal without polyps  Neck: supple and non-tender without mass, no thyromegaly or thyroid nodules, no cervical lymphadenopathy  Pulmonary/Chest: clear to auscultation bilaterally- no wheezes  Cardiovascular: normal rate, regular rhythm, normal S1 and S2, no murmurs  Abdomen: soft, non-tender, non-distended, normal bowel sounds, no masses or organomegaly  Extremities: no cyanosis, clubbing or edema  Musculoskeletal: normal range of motion, no joint swelling, deformity or tenderness  Neurologic: Follows command, no cranial nerve deficit, and speech normal      LABS:  Recent Labs     02/18/20  0115   *   K 4.6   CL 93*   CO2 22   BUN 24*   CREATININE 0.8   GLUCOSE 306*   CALCIUM 8.8       Recent Labs     02/18/20  0115   WBC 15.6*   RBC 3.52   HGB 8.9*   HCT 30.3*   MCV 86.1   MCH 25.3*   MCHC 29.4*   RDW 16.5*      MPV 10.9 No results for input(s): POCGLU in the last 72 hours. CBC with Differential:    Lab Results   Component Value Date    WBC 15.6 02/18/2020    RBC 3.52 02/18/2020    HGB 8.9 02/18/2020    HCT 30.3 02/18/2020     02/18/2020    MCV 86.1 02/18/2020    MCH 25.3 02/18/2020    MCHC 29.4 02/18/2020    RDW 16.5 02/18/2020    LYMPHOPCT 3.7 02/18/2020    MONOPCT 2.0 02/18/2020    BASOPCT 0.1 02/18/2020    MONOSABS 0.31 02/18/2020    LYMPHSABS 0.57 02/18/2020    EOSABS 0.02 02/18/2020    BASOSABS 0.01 02/18/2020     CMP:    Lab Results   Component Value Date     02/18/2020    K 4.6 02/18/2020    CL 93 02/18/2020    CO2 22 02/18/2020    BUN 24 02/18/2020    CREATININE 0.8 02/18/2020    GFRAA >60 02/18/2020    LABGLOM >60 02/18/2020    GLUCOSE 306 02/18/2020    PROT 6.6 02/18/2020    LABALBU 2.9 02/18/2020    CALCIUM 8.8 02/18/2020    BILITOT 0.3 02/18/2020    ALKPHOS 54 02/18/2020    AST 11 02/18/2020    ALT 7 02/18/2020       Radiology: No results found. ASSESSMENT/ PLAN[de-identified]      Active Problems:    Adult failure to thrive  Resolved Problems:    * No resolved hospital problems. *      1. Failure to thrive   Patient has not been able to eat or drink for the past 24 hours per family   Currently dehydrated and worsening altered mental status   Start IV hydration   Assess swallowing   Palliative consult  2. Dehydration   Continue IV hydration   Assess swallowing   Encourage oral intake  3. UTI   Has suprapubic catheter and Saleh catheter   Low urine output   Previous urine culture was positive on the 5th of this month with Klebsiella oxytoca    Start Rocephin-we will continue Rocephin given the patient's fatigue and changes in mentation  4. Hyponatremia-mild after correction to her hyperglycemia   Likely due to hypovolemia and low intake   Will continue normal saline and monitor  5.  Diabetes   Blood glucose has not been controlled at home   Currently over 300   Start patient on insulin sliding scale for now  6. Uterine cancer with metastasis   Plan for palliative chemo   Unknown if patient really cannot tolerate any chemo   Will consult palliative care   Plan to have discussion with the son regarding CODE STATUS and  hospice  7. Recent DVT   Patient diagnosed with DVT on the right lower extremity 2 weeks ago   Unknown why anticoagulation was held upon discharge   Hemoglobin has been stable- currently family continues to have mild vaginal bleed   Will start patient on Lovenox therapeutic dose while in here    Code Status: Full  DVT prophylaxis: Lovenox       Electronically signed by Gwyn Lubin MD on 2/18/2020 at 4:52 AM      NOTE: This report was transcribed using voice recognition software. Every effort was made to ensure accuracy; however, inadvertent computerized transcription errors may be present.

## 2020-02-19 VITALS
DIASTOLIC BLOOD PRESSURE: 42 MMHG | OXYGEN SATURATION: 94 % | WEIGHT: 125.4 LBS | SYSTOLIC BLOOD PRESSURE: 92 MMHG | RESPIRATION RATE: 16 BRPM | HEIGHT: 62 IN | TEMPERATURE: 98 F | HEART RATE: 83 BPM | BODY MASS INDEX: 23.08 KG/M2

## 2020-02-19 LAB — METER GLUCOSE: 101 MG/DL (ref 74–99)

## 2020-02-19 PROCEDURE — 6370000000 HC RX 637 (ALT 250 FOR IP): Performed by: PHYSICIAN ASSISTANT

## 2020-02-19 PROCEDURE — 82962 GLUCOSE BLOOD TEST: CPT

## 2020-02-19 PROCEDURE — 96366 THER/PROPH/DIAG IV INF ADDON: CPT

## 2020-02-19 PROCEDURE — 99217 PR OBSERVATION CARE DISCHARGE MANAGEMENT: CPT | Performed by: INTERNAL MEDICINE

## 2020-02-19 PROCEDURE — 6360000002 HC RX W HCPCS: Performed by: INTERNAL MEDICINE

## 2020-02-19 PROCEDURE — 6370000000 HC RX 637 (ALT 250 FOR IP): Performed by: INTERNAL MEDICINE

## 2020-02-19 PROCEDURE — 6360000002 HC RX W HCPCS

## 2020-02-19 PROCEDURE — G0378 HOSPITAL OBSERVATION PER HR: HCPCS

## 2020-02-19 PROCEDURE — 96372 THER/PROPH/DIAG INJ SC/IM: CPT

## 2020-02-19 PROCEDURE — 2700000000 HC OXYGEN THERAPY PER DAY

## 2020-02-19 PROCEDURE — 2580000003 HC RX 258: Performed by: INTERNAL MEDICINE

## 2020-02-19 RX ORDER — HEPARIN SODIUM (PORCINE) LOCK FLUSH IV SOLN 100 UNIT/ML 100 UNIT/ML
SOLUTION INTRAVENOUS
Status: COMPLETED
Start: 2020-02-19 | End: 2020-02-19

## 2020-02-19 RX ADMIN — MORPHINE SULFATE 15 MG: 30 TABLET ORAL at 06:17

## 2020-02-19 RX ADMIN — CEFTRIAXONE 1 G: 1 INJECTION, POWDER, FOR SOLUTION INTRAMUSCULAR; INTRAVENOUS at 03:36

## 2020-02-19 RX ADMIN — LITHIUM CARBONATE 300 MG: 300 CAPSULE, GELATIN COATED ORAL at 09:05

## 2020-02-19 RX ADMIN — DOCUSATE SODIUM 50MG AND SENNOSIDES 8.6MG 2 TABLET: 8.6; 5 TABLET, FILM COATED ORAL at 09:05

## 2020-02-19 RX ADMIN — PANTOPRAZOLE SODIUM 40 MG: 40 TABLET, DELAYED RELEASE ORAL at 06:17

## 2020-02-19 RX ADMIN — SODIUM CHLORIDE: 9 INJECTION, SOLUTION INTRAVENOUS at 03:36

## 2020-02-19 RX ADMIN — LEVOTHYROXINE SODIUM 50 MCG: 50 TABLET ORAL at 06:17

## 2020-02-19 RX ADMIN — ENOXAPARIN SODIUM 60 MG: 60 INJECTION SUBCUTANEOUS at 09:06

## 2020-02-19 RX ADMIN — SODIUM CHLORIDE, PRESERVATIVE FREE: 5 INJECTION INTRAVENOUS at 12:46

## 2020-02-19 ASSESSMENT — PAIN SCALES - GENERAL: PAINLEVEL_OUTOF10: 7

## 2020-02-19 NOTE — PROGRESS NOTES
PALLIATIVE MEDICINE    Palliative medicine consulted for goals of care, code status discussion and family support. Patient's goals of care have been established. Patient code status has been reviewed and patient elects to be a [facility]  Discharge plan is for patient to home with HOV today. Patient with medications required for discharge  No additional palliative medicine needs identified currently. Palliative medicine will sign-off. Please re-consult our service if additional Palliative medicine needs arise or there is a change in the patients condition. Thank you for the consult.     Jarrod Dillon PA-C

## 2020-02-19 NOTE — DISCHARGE SUMMARY
[facility] Physician Discharge Summary       Lupe Galeazzi, DO  [address] [place] [id number]  [phone]            Activity level: As tolerated. Dispo: Home hospice. Condition on discharge: Stable. Patient ID:  Billy Milan  [id number]  55 y.o.  1950    Admit date: 2/18/2020    Discharge date and time:  2/19/2020  1:32 PM    Admission Diagnoses: Active Problems:    Adult failure to thrive  Resolved Problems:    * No resolved hospital problems. *      Discharge Diagnoses: Active Problems:    Adult failure to thrive  Resolved Problems:    * No resolved hospital problems. *      Consults:  IP CONSULT TO PALLIATIVE CARE  IP CONSULT TO DIETITIAN  IP CONSULT TO HOSPICE    Procedures: None. Hospital Course:   Patient Billy Milan is a 79 y.o. 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 She has of stage IV ulcer over the coccyx and buttocks. Discharge from here 2 weeks back and son refused refused hospice care and take to home. This time she came here because she is unable to eat or drinking and continuously deteriorating situation. Palliative consult done in this time patient's son and daughter-in-law decided to take her at home with hospice care. I talked with her son today over phone and he wants to continue her regular medication.   She is being discharged with her home medications and hospice care at home    Discharge Exam:    General Appearance: alert and oriented to person, place and time and in no acute distress  Skin: warm and dry  Head: normocephalic and atraumatic  Eyes: pupils equal, round, and reactive to light, extraocular eye movements intact, conjunctivae normal  Neck: neck supple and non tender without mass Pulmonary/Chest: clear to auscultation bilaterally- no wheezes, rales or rhonchi, normal air movement, no respiratory distress  Cardiovascular: normal rate, normal S1 and S2 and no carotid bruits  Abdomen: soft, non-tender, non-distended, normal bowel sounds, no masses or organomegaly  Extremities: no cyanosis, no clubbing and no edema   Neurologic: no cranial nerve deficit and speech normal  Back-test for ulcer and infection over the buttocks and coccyx and sacrum. I/O last 3 completed shifts: In:  [P.O.:840; I.V.:1150; IV Piggyback:50]  Out: 7001 [Urine:4475]  I/O this shift:  In: 220 [P.O.:220]  Out: 650 [Urine:650]      LABS:  Recent Labs     20  0115   *   K 4.6   CL 93*   CO2 22   BUN 24*   CREATININE 0.8   GLUCOSE 306*   CALCIUM 8.8       Recent Labs     20  0115   WBC 15.6*   RBC 3.52   HGB 8.9*   HCT 30.3*   MCV 86.1   MCH 25.3*   MCHC 29.4*   RDW 16.5*      MPV 10.9       No results for input(s): POCGLU in the last 72 hours. Imaging:  Xr Chest 1 Vw    Result Date: 2020  Patient MRN:  [id number] : 1950 Age: 79 years Gender: Female Order Date:  2020 1:15 AM EXAM: XR CHEST 1 VIEW INDICATION:  sepsis, on o2, lung beau from uterine CA sepsis, on o2, lung beau from uterine CA COMPARISON: 2020 FINDINGS:  Heart size is normal. There is a right-sided chest port. There are no acute infiltrates or effusions. Nodular densities suggested in the left mid lung medially and in right lung base. These may reflect metastasis. There are no effusions. No interval change       Patient Instructions:      Medication List      CHANGE how you take these medications    morphine 15 MG tablet  Commonly known as:  MSIR  Take 1 tablet by mouth every 4 hours as needed for Pain for up to 7 days. Take 1/2 tab if full makes too tired  What changed:  Another medication with the same name was removed. Continue taking this medication, and follow the directions you see here.

## 2020-02-20 LAB
ORGANISM: ABNORMAL
URINE CULTURE, ROUTINE: ABNORMAL

## 2020-02-23 LAB
BLOOD CULTURE, ROUTINE: NORMAL
CULTURE, BLOOD 2: NORMAL